# Patient Record
Sex: MALE | Race: WHITE | Employment: PART TIME | URBAN - METROPOLITAN AREA
[De-identification: names, ages, dates, MRNs, and addresses within clinical notes are randomized per-mention and may not be internally consistent; named-entity substitution may affect disease eponyms.]

---

## 2017-01-04 ENCOUNTER — HOSPITAL ENCOUNTER (OUTPATIENT)
Dept: GENERAL RADIOLOGY | Age: 37
Discharge: HOME | End: 2017-01-04
Payer: MEDICAID

## 2017-01-04 DIAGNOSIS — J45.30 MILD PERSISTENT ASTHMA: ICD-10-CM

## 2017-01-04 PROCEDURE — 71020 XR CHEST PA LAT: CPT

## 2019-02-19 ENCOUNTER — OFFICE VISIT (OUTPATIENT)
Dept: PRIMARY CARE CLINIC | Age: 39
End: 2019-02-19

## 2019-02-19 VITALS
SYSTOLIC BLOOD PRESSURE: 95 MMHG | HEART RATE: 55 BPM | TEMPERATURE: 98.1 F | BODY MASS INDEX: 22.45 KG/M2 | DIASTOLIC BLOOD PRESSURE: 60 MMHG | HEIGHT: 69 IN | OXYGEN SATURATION: 97 % | RESPIRATION RATE: 18 BRPM | WEIGHT: 151.6 LBS

## 2019-02-19 DIAGNOSIS — F25.9 SCHIZOAFFECTIVE DISORDER, UNSPECIFIED TYPE (HCC): ICD-10-CM

## 2019-02-19 DIAGNOSIS — M54.50 ACUTE LEFT-SIDED LOW BACK PAIN WITHOUT SCIATICA: Primary | ICD-10-CM

## 2019-02-19 DIAGNOSIS — E03.9 ACQUIRED HYPOTHYROIDISM: ICD-10-CM

## 2019-02-19 DIAGNOSIS — L70.9 ACNE, UNSPECIFIED ACNE TYPE: ICD-10-CM

## 2019-02-19 DIAGNOSIS — F70 MILD INTELLECTUAL DISABILITY: ICD-10-CM

## 2019-02-19 PROBLEM — H55.00 NYSTAGMUS: Status: ACTIVE | Noted: 2019-02-19

## 2019-02-19 RX ORDER — TAMSULOSIN HYDROCHLORIDE 0.4 MG/1
0.4 CAPSULE ORAL DAILY
COMMUNITY
End: 2022-10-12

## 2019-02-19 RX ORDER — IBUPROFEN 600 MG/1
600 TABLET ORAL EVERY 12 HOURS
Qty: 10 TAB | Refills: 0 | Status: SHIPPED | OUTPATIENT
Start: 2019-02-19 | End: 2019-02-20 | Stop reason: SDUPTHER

## 2019-02-19 RX ORDER — CLINDAMYCIN PHOSPHATE 10 MG/G
GEL TOPICAL 2 TIMES DAILY
COMMUNITY
End: 2019-05-06 | Stop reason: SDUPTHER

## 2019-02-19 RX ORDER — DOCUSATE SODIUM 100 MG/1
100 CAPSULE, LIQUID FILLED ORAL 2 TIMES DAILY
COMMUNITY
End: 2019-04-17 | Stop reason: SDUPTHER

## 2019-02-19 RX ORDER — ACETAMINOPHEN 500 MG
TABLET ORAL
COMMUNITY
End: 2019-02-19 | Stop reason: SDUPTHER

## 2019-02-19 RX ORDER — ACETAMINOPHEN 500 MG
500 TABLET ORAL
Qty: 30 TAB | Refills: 2 | Status: SHIPPED | OUTPATIENT
Start: 2019-02-19 | End: 2020-11-02

## 2019-02-19 RX ORDER — CIPROFLOXACIN AND DEXAMETHASONE 3; 1 MG/ML; MG/ML
4 SUSPENSION/ DROPS AURICULAR (OTIC) 2 TIMES DAILY
COMMUNITY
End: 2021-10-18 | Stop reason: ALTCHOICE

## 2019-02-19 NOTE — PROGRESS NOTES
Written by Carolyne Valdez, as dictated by Dr. Faheem Pritchett MD.    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 HPI  The patient comes in today to establish care. His group home staff member is helping to provide his HX. Patient's medical HX is significant for schizoaffective disorder, depressive disorder, hypothyroidism, nystagmus, BL hearing loss w/ BL hearing aids, asthma, mild ID. Denies surgical HX. [place] Patient has been experiencing L side low back pain off and on x about 2 months. His group home staff member has not hear him c/o back pain prior to today. His pain does not radiate. The patient notes that he has to bend over frequently while he works, which is when he experiences the pain. He describes the sensation as \"achey\" and notes that he took OTC acetaminophen 500 mg last night. His medication list shows that he is taking vitamin D3 2,000 iu daily but his staff member does not know why he is taking it. He is a smoker. Patient smokes 3 cigarettes per week day and 4 cigarettes per weekend days. The patient notes that when he was a child he has frequent ear infections and believes he may have had tubes placed. Followed by urology as patient uses a catheter. His group home staff member is not sure why he has a catheter, but believes it is related to \"his bladder being heavy. \" Pt is taking Flomax 0.4 mg. He sees a podiatrist but his group home staff member notes that he has not been in a while.     Followed by dermatology for acne on face and chest.    Patient Active Problem List   Diagnosis Code    Mild intellectual disability F70    Schizoaffective disorder ([facility] F25.9    Acute left-sided low back pain without sciatica M54.5    Acquired hypothyroidism E03.9    Acne L70.9        Current Outpatient Medications on File Prior to Visit   Medication Sig Dispense Refill    ciprofloxacin-dexamethasone (CIPRODEX) 0.3-0.1 % otic suspension Administer 4 Drops in left ear two (2) times a day.  clindamycin (CLINDAGEL) 1 % topical gel Apply  to affected area two (2) times a day. use thin film on affected area      docusate sodium (DULCOLAX STOOL SOFTENER, DSS,) 100 mg capsule Take 100 mg by mouth two (2) times a day.  tamsulosin (FLOMAX) 0.4 mg capsule Take 0.4 mg by mouth daily.  albuterol sulfate 90 mcg/actuation aepb Take  by inhalation as needed.  gabapentin (NEURONTIN) 400 mg capsule Take 400 mg by mouth three (3) times daily.  levothyroxine (SYNTHROID) 100 mcg tablet Take 100 mcg by mouth Daily (before breakfast).  lithium carbonate (LITHOBID) 150 mg capsule Take 150 mg by mouth daily.  lithium carbonate (LITHONATE) 300 mg capsule Take 300 mg by mouth daily. morning      lithium carbonate (LITHONATE) 300 mg capsule Take 300 mg by mouth daily. At night      OLANZapine (ZYPREXA) 15 mg tablet Take 15 mg by mouth nightly.  propranolol (INDERAL) 20 mg tablet Take 20 mg by mouth two (2) times a day.  QUEtiapine (SEROQUEL) 400 mg tablet Take 400 mg by mouth two (2) times a day.  sertraline (ZOLOFT) 50 mg tablet Take 50 mg by mouth daily.  ibuprofen (MOTRIN) 600 mg tablet Take 1 Tab by mouth every eight (8) hours as needed for Pain. 30 Tab 0    cholecalciferol, vitamin D3, 2,000 unit tab Take 200 Units by mouth. No current facility-administered medications on file prior to visit.         Past Medical History:   Diagnosis Date    Depressive disorder     Hypothyroid     Schizoaffective disorder (HCC)     Uses hearing aid        Social History     Socioeconomic History    Marital status: SINGLE     Spouse name: Not on file    Number of children: Not on file    Years of education: Not on file    Highest education level: Not on file   Social Needs    Financial resource strain: Not on file    Food insecurity - worry: Not on file    Food insecurity - inability: Not on file    Transportation needs - medical: Not on file   Kior needs - non-medical: Not on file   Occupational History    Not on file   Tobacco Use    Smoking status: Current Every Day Smoker     Packs/day: 0.25    Smokeless tobacco: Current User   Substance and Sexual Activity    Alcohol use: No    Drug use: No    Sexual activity: Not on file   Other Topics Concern    Not on file   Social History Narrative    Not on file       Review of Systems   HENT: Positive for hearing loss. Negative for congestion. Genitourinary: Negative for frequency and urgency. Musculoskeletal: Positive for back pain (L side low). Negative for joint pain and myalgias. Skin:        +Acne on face and chest   Neurological: Negative for dizziness, tingling, sensory change and headaches. Psychiatric/Behavioral: Negative for depression, memory loss and substance abuse. Visit Vitals  BP 95/60 (BP 1 Location: Right arm, BP Patient Position: Sitting)   Pulse (!) 55   Temp 98.1 °F (36.7 °C) (Oral)   Resp 18   Ht 5' 9\" (1.753 m)   Wt 151 lb 9.6 oz (68.8 kg)   SpO2 97%   BMI 22.39 kg/m²       Physical Exam   Constitutional: He is oriented to person, place, and time. He appears well-developed and well-nourished. No distress. HENT:   Right Ear: Tympanic membrane, external ear and ear canal normal.   Left Ear: Tympanic membrane, external ear and ear canal normal.   Mouth/Throat: Oropharynx is clear and moist. He does not have dentures. Abnormal dentition. No oropharyngeal exudate. BL hearing aid  Old scar marks in L ear  No teeth   Eyes: Conjunctivae are normal. Right eye exhibits no discharge. Left eye exhibits no discharge. Right eye exhibits nystagmus. Left eye exhibits nystagmus. Neck: Normal range of motion. Neck supple. Cardiovascular: Normal rate and regular rhythm. Pulmonary/Chest: Effort normal and breath sounds normal. He has no wheezes. Abdominal: Soft. Bowel sounds are normal. There is no tenderness. Musculoskeletal:   Non mayra.  Mild low back pain on flexion and extension   Lymphadenopathy:     He has no cervical adenopathy. Neurological: He is alert and oriented to person, place, and time. Skin: He is not diaphoretic. Psychiatric: He has a normal mood and affect. His behavior is normal.   Nursing note and vitals reviewed. ASSESSMENT and PLAN    ICD-10-CM ICD-9-CM    1. Acute left-sided low back pain without sciatica M54.5 724.2 Ibuprofen 600 mg prescribed. He should take 1 tab PO every 12 hours with food. 2. [facility]oaffective disorder, unspecified type [facility] F25.9 295.70 Patient is under the care of a group home and staff makes sure he is compliant on medication. 3. Mild intellectual disability F70 317 Patient is under the care of a group home. 4. Acquired hypothyroidism E03.9 244.9 Compliant on levothyroxine 100 mcg. Will do lab in next visit. 5. Acne, unspecified acne type L70.9 706.1 Followed by dermatology. Medication risks/benefits/costs/interactions/alternatives discussed with patient. Advised patient to call back or return to office if symptoms worsen/change/persist. If patient cannot reach us or should anything more severe/urgent arise he/she should proceed directly to the nearest emergency department. Discussed expected course/resolution/complications of diagnosis in detail with patient. Patient given a written after visit summary which includes her diagnoses, current medications and vitals. Patient expressed understanding with the diagnosis and plan.   Follow up if symptoms worsen. This plan was reviewed with the patient and patient agrees. All questions were answered. This scribe documentation was reviewed by me and accurately reflects the examination and decisions made by me.

## 2019-04-09 ENCOUNTER — TELEPHONE (OUTPATIENT)
Dept: PRIMARY CARE CLINIC | Age: 39
End: 2019-04-09

## 2019-04-09 NOTE — TELEPHONE ENCOUNTER
Estelle is the supervisor at the group home. States that pt had blood in stool today but that was after not having a BM in 2 days and then today he finally had a large bowel movement. States that she thinks the blood was from him straining, but he is fine. No more blood noticed. Will follow up as needed. They have to let us know when there are any significant changes.

## 2019-04-09 NOTE — TELEPHONE ENCOUNTER
Estelle would like to report the status of a bowel movement.   She may be reached back @ [phone]

## 2019-05-06 ENCOUNTER — OFFICE VISIT (OUTPATIENT)
Dept: PRIMARY CARE CLINIC | Age: 39
End: 2019-05-06

## 2019-05-06 VITALS
DIASTOLIC BLOOD PRESSURE: 65 MMHG | BODY MASS INDEX: 22.48 KG/M2 | OXYGEN SATURATION: 97 % | HEIGHT: 69 IN | HEART RATE: 63 BPM | WEIGHT: 151.8 LBS | RESPIRATION RATE: 16 BRPM | TEMPERATURE: 97.5 F | SYSTOLIC BLOOD PRESSURE: 98 MMHG

## 2019-05-06 DIAGNOSIS — Z00.00 WELL ADULT ON ROUTINE HEALTH CHECK: Primary | ICD-10-CM

## 2019-05-06 DIAGNOSIS — L70.9 ACNE, UNSPECIFIED ACNE TYPE: ICD-10-CM

## 2019-05-06 DIAGNOSIS — E03.9 ACQUIRED HYPOTHYROIDISM: ICD-10-CM

## 2019-05-06 DIAGNOSIS — E55.9 VITAMIN D DEFICIENCY: ICD-10-CM

## 2019-05-06 DIAGNOSIS — Z11.1 SCREENING-PULMONARY TB: ICD-10-CM

## 2019-05-06 RX ORDER — CLINDAMYCIN PHOSPHATE 10 MG/G
GEL TOPICAL 2 TIMES DAILY
Qty: 1 ML | Refills: 3 | Status: SHIPPED | OUTPATIENT
Start: 2019-05-06 | End: 2020-04-22 | Stop reason: SDUPTHER

## 2019-05-06 NOTE — PROGRESS NOTES
Claire Connor is a 41 y.o. male    Exam RM: 9    Chief Complaint   Patient presents with    Physical     Pt is here for a PE.       1. Have you been to the ER, urgent care clinic since your last visit? Hospitalized since your last visit? No     2. Have you seen or consulted any other health care providers outside of the [address] [place] [address] since your last visit? Include any pap smears or colon screening.   No       Health Maintenance Due   Topic Date Due    Pneumococcal 0-64 years (1 of 1 - PPSV23) [birth year]1988         Visit Vitals  BP 98/65 (BP 1 Location: Left arm, BP Patient Position: Sitting)   Pulse 63   Temp 97.5 °F (36.4 °C) (Oral)   Resp 16   Ht 5' 9\" (1.753 m)   Wt 151 lb 12.8 oz (68.9 kg)   SpO2 97%   BMI 22.42 kg/m²

## 2019-05-06 NOTE — PROGRESS NOTES
Subjective:   Emily Bobyb is a 41 y.o. male presenting for his annual checkup. He is here with his group home staff member. His  medical HX is significant for schizoaffective disorder, depressive disorder, hypothyroidism, nystagmus, BL hearing loss w/ BL hearing aids, asthma, mild ID. He has been following up with psychiatrist regularly. Hypothyroid; he is currently on synthroid 100 mcg daily. Acne: well controlled with Clindagel. Need refill. He has been following up with urologist regularly and taking Flomax 0.4 mg.     Currently On Vit D 2,000 iu daily. Need  TB screen. Need sunscreen refill. Brought special olympic form for me to filled out. ROS:  Feeling well. No dyspnea or chest pain on exertion. No abdominal pain, change in bowel habits, black or bloody stools. No urinary tract or prostatic symptoms. No neurological complaints. Specific concerns today: refer to HPI. Patient Active Problem List   Diagnosis Code    Mild intellectual disability F70    Schizoaffective disorder ([facility] F25.9    Acute left-sided low back pain without sciatica M54.5    Acquired hypothyroidism E03.9    Acne L70.9    Nystagmus H55.00     Current Outpatient Medications   Medication Sig Dispense Refill    clindamycin (CLINDAGEL) 1 % topical gel Apply  to affected area two (2) times a day. use thin film on affected area 1 mL 3    sunscreen 15 SPF lotion Use it as needed. 1 Bottle 1    DOC-Q-LACE 100 mg capsule TAKE (1) CAPSULE BY MOUTH DAILY 30 Cap PRN    levothyroxine (SYNTHROID) 100 mcg tablet TAKE 1 TABLET BY MOUTH DAILY 30 Tab PRN    ibuprofen (MOTRIN) 600 mg tablet Take 1 Tab by mouth every twelve (12) hours every twelve (12) hours. With food for back pain 10 Tab 0    tamsulosin (FLOMAX) 0.4 mg capsule Take 0.4 mg by mouth daily.  albuterol sulfate 90 mcg/actuation aepb Take  by inhalation as needed.       acetaminophen (TYLENOL EXTRA STRENGTH) 500 mg tablet Take 1 Tab by mouth every six (6) hours as needed for Pain or Fever. 30 Tab 2    ibuprofen (MOTRIN) 600 mg tablet Take 1 Tab by mouth every eight (8) hours as needed for Pain. 30 Tab 0    cholecalciferol, vitamin D3, 2,000 unit tab Take 200 Units by mouth.  gabapentin (NEURONTIN) 400 mg capsule Take 400 mg by mouth three (3) times daily.  lithium carbonate (LITHOBID) 150 mg capsule Take 150 mg by mouth daily.  lithium carbonate (LITHONATE) 300 mg capsule Take 300 mg by mouth daily. morning      lithium carbonate (LITHONATE) 300 mg capsule Take 300 mg by mouth daily. At night      OLANZapine (ZYPREXA) 15 mg tablet Take 15 mg by mouth nightly.  propranolol (INDERAL) 20 mg tablet Take 20 mg by mouth two (2) times a day.  QUEtiapine (SEROQUEL) 400 mg tablet Take 400 mg by mouth two (2) times a day.  ciprofloxacin-dexamethasone (CIPRODEX) 0.3-0.1 % otic suspension Administer 4 Drops in left ear two (2) times a day.  sertraline (ZOLOFT) 50 mg tablet Take 50 mg by mouth daily. No Known Allergies  Past Medical History:   Diagnosis Date    Depressive disorder     Hypothyroid     Schizoaffective disorder ([facility] [address] [facility]     Uses hearing aid      History reviewed. No pertinent surgical history. History reviewed. No pertinent family history. Social History     Tobacco Use    Smoking status: Current Every Day Smoker     Packs/day: 0.25    Smokeless tobacco: Current User   Substance Use Topics    Alcohol use: No             Objective:     Visit Vitals  BP 98/65 (BP 1 Location: Left arm, BP Patient Position: Sitting)   Pulse 63   Temp 97.5 °F (36.4 °C) (Oral)   Resp 16   Ht 5' 9\" (1.753 m)   Wt 151 lb 12.8 oz (68.9 kg)   SpO2 97%   BMI 22.42 kg/m²     The patient appears well, alert, oriented x 2, in no distress. Nystagmus noted. ENT normal. Wearing hearing aid. Neck supple. No adenopathy or thyromegaly. AVILA. Lungs are clear, good air entry, no wheezes, rhonchi or rales.  S1 and S2 normal, no murmurs, regular rate and rhythm. Abdomen is soft without tenderness, guarding, mass or organomegaly.  exam: deferred. Extremities show no edema, normal peripheral pulses. Neurological is normal without focal findings. Acne: old scar marks but otherwise no active acne noted. Assessment/Plan:   healthy adult male  follow low fat diet, follow low salt diet, continue present plan, routine labs ordered, call if any problems. ICD-10-CM ICD-9-CM    1. Well adult on routine health check Z00.00 V70.0 CBC WITH AUTOMATED DIFF      LIPID PANEL      METABOLIC PANEL, COMPREHENSIVE   2. Acquired hypothyroidism E03.9 244.9 TSH AND FREE T4   3. Acne, unspecified acne type L70.9 706.1 clindamycin (CLINDAGEL) 1 % topical gel   4. Screening-pulmonary TB Z11.1 V74.1 QUANTIFERON-TB GOLD PLUS   5. Vitamin D deficiency E55.9 268.9 VITAMIN D, 25 HYDROXY     Diagnoses and all orders for this visit:    1. Well adult on routine health check  -     CBC WITH AUTOMATED DIFF  -     LIPID PANEL  -     METABOLIC PANEL, COMPREHENSIVE    2. Acquired hypothyroidism  -     TSH AND FREE T4              Will notify if any change needed. 3. Acne, unspecified acne type  -   Stable with   clindamycin (CLINDAGEL) 1 % topical gel; Apply  to affected area two (2) times a day. use thin film on affected area    4. Screening-pulmonary TB  -     QUANTIFERON-TB GOLD PLUS    5. Vitamin D deficiency  -     VITAMIN D, 25 HYDROXY    Other orders  -     sunscreen 15 SPF lotion; Use it as needed. Follow-up and Dispositions    · Return in about 6 months (around 11/6/2019) for  6 months check ip. .       reviewed diet, exercise and weight control  reviewed medications and side effects in detail.

## 2019-05-07 LAB
25(OH)D3+25(OH)D2 SERPL-MCNC: 28.9 NG/ML (ref 30–100)
ALBUMIN SERPL-MCNC: 4.7 G/DL (ref 3.5–5.5)
ALBUMIN/GLOB SERPL: 1.7 {RATIO} (ref 1.2–2.2)
ALP SERPL-CCNC: 76 IU/L (ref 39–117)
ALT SERPL-CCNC: 15 IU/L (ref 0–44)
AST SERPL-CCNC: 19 IU/L (ref 0–40)
BASOPHILS # BLD AUTO: 0.1 X10E3/UL (ref 0–0.2)
BASOPHILS NFR BLD AUTO: 1 %
BILIRUB SERPL-MCNC: 0.4 MG/DL (ref 0–1.2)
BUN SERPL-MCNC: 13 MG/DL (ref 6–20)
BUN/CREAT SERPL: 15 (ref 9–20)
CALCIUM SERPL-MCNC: 10.3 MG/DL (ref 8.7–10.2)
CHLORIDE SERPL-SCNC: 105 MMOL/L (ref 96–106)
CHOLEST SERPL-MCNC: 155 MG/DL (ref 100–199)
CO2 SERPL-SCNC: 20 MMOL/L (ref 20–29)
CREAT SERPL-MCNC: 0.86 MG/DL (ref 0.76–1.27)
EOSINOPHIL # BLD AUTO: 0.2 X10E3/UL (ref 0–0.4)
EOSINOPHIL NFR BLD AUTO: 2 %
ERYTHROCYTE [DISTWIDTH] IN BLOOD BY AUTOMATED COUNT: 14.5 % (ref 12.3–15.4)
GLOBULIN SER CALC-MCNC: 2.8 G/DL (ref 1.5–4.5)
GLUCOSE SERPL-MCNC: 95 MG/DL (ref 65–99)
HCT VFR BLD AUTO: 48.3 % (ref 37.5–51)
HDLC SERPL-MCNC: 60 MG/DL
HGB BLD-MCNC: 16.6 G/DL (ref 13–17.7)
IMM GRANULOCYTES # BLD AUTO: 0 X10E3/UL (ref 0–0.1)
IMM GRANULOCYTES NFR BLD AUTO: 0 %
LDLC SERPL CALC-MCNC: 79 MG/DL (ref 0–99)
LYMPHOCYTES # BLD AUTO: 1.9 X10E3/UL (ref 0.7–3.1)
LYMPHOCYTES NFR BLD AUTO: 19 %
MCH RBC QN AUTO: 29.7 PG (ref 26.6–33)
MCHC RBC AUTO-ENTMCNC: 34.4 G/DL (ref 31.5–35.7)
MCV RBC AUTO: 87 FL (ref 79–97)
MONOCYTES # BLD AUTO: 0.6 X10E3/UL (ref 0.1–0.9)
MONOCYTES NFR BLD AUTO: 6 %
NEUTROPHILS # BLD AUTO: 7.4 X10E3/UL (ref 1.4–7)
NEUTROPHILS NFR BLD AUTO: 72 %
PLATELET # BLD AUTO: 234 X10E3/UL (ref 150–379)
POTASSIUM SERPL-SCNC: 4.6 MMOL/L (ref 3.5–5.2)
PROT SERPL-MCNC: 7.5 G/DL (ref 6–8.5)
RBC # BLD AUTO: 5.58 X10E6/UL (ref 4.14–5.8)
SODIUM SERPL-SCNC: 141 MMOL/L (ref 134–144)
T4 FREE SERPL-MCNC: 1.3 NG/DL (ref 0.82–1.77)
TRIGL SERPL-MCNC: 81 MG/DL (ref 0–149)
TSH SERPL DL<=0.005 MIU/L-ACNC: 0.5 UIU/ML (ref 0.45–4.5)
VLDLC SERPL CALC-MCNC: 16 MG/DL (ref 5–40)
WBC # BLD AUTO: 10.3 X10E3/UL (ref 3.4–10.8)

## 2019-05-07 NOTE — PROGRESS NOTES
Please mail letter:     1. CBC, kidney, liver, thyroid level is within normal range. 2. Cholesterol level is normal    3. Vit d level is slightly low from the normal range. Continue  2,000 IU vit D daily. Increase vit D rich food. Exposed to sun total of 60 minutes /week will help. Will recheck level in 6 months.

## 2019-05-09 LAB
GAMMA INTERFERON BACKGROUND BLD IA-ACNC: 0.01 IU/ML
M TB IFN-G BLD-IMP: NEGATIVE
M TB IFN-G CD4+ BCKGRND COR BLD-ACNC: 0.01 IU/ML
MITOGEN IGNF BLD-ACNC: >10 IU/ML
QUANTIFERON INCUBATION, QF1T: NORMAL
QUANTIFERON TB2 AG: 0.01 IU/ML
SERVICE CMNT-IMP: NORMAL

## 2019-10-02 NOTE — TELEPHONE ENCOUNTER
Spoke with Wilber Westbrook, and let her know to call his urologist. She verbalized her understanding and stated she is going to call them and ask for a refill. She stated she will call us back if they won't refill it.

## 2019-10-03 RX ORDER — TAMSULOSIN HYDROCHLORIDE 0.4 MG/1
0.4 CAPSULE ORAL DAILY
OUTPATIENT
Start: 2019-10-03

## 2019-10-09 ENCOUNTER — TELEPHONE (OUTPATIENT)
Dept: PRIMARY CARE CLINIC | Age: 39
End: 2019-10-09

## 2019-10-09 NOTE — TELEPHONE ENCOUNTER
Do you suggest that the patient needs an eye exam?     I will also let them know once I contact them that they need to get the documentation requested from his urologist.

## 2019-10-09 NOTE — TELEPHONE ENCOUNTER
Spoke with Silvano and let her know to call his vision insurance and see who they cover as well as call his urologist to see \"why\" he needs to cath himself. She verbalized her understanding and thanked me for the call.

## 2019-10-09 NOTE — TELEPHONE ENCOUNTER
----- Message from Kevan Alvarez sent at 10/8/2019 10:32 AM EDT -----  Regarding: /Carlo Schaefer a Home Care Manager advised she needs information regarding th pt vision and does he need an eye exam. She also advised the need documentation on why the pt is \"self caphatilized\". Best contact [phone].

## 2019-10-09 NOTE — TELEPHONE ENCOUNTER
Yearly eye exam is recommended. They need to call his eye insurance for the provider name to make appointment.

## 2019-10-16 ENCOUNTER — HOSPITAL ENCOUNTER (EMERGENCY)
Age: 39
Discharge: HOME | End: 2019-10-16
Attending: EMERGENCY MEDICINE
Payer: MEDICAID

## 2019-10-16 VITALS
RESPIRATION RATE: 22 BRPM | OXYGEN SATURATION: 100 % | HEART RATE: 80 BPM | DIASTOLIC BLOOD PRESSURE: 61 MMHG | SYSTOLIC BLOOD PRESSURE: 104 MMHG | TEMPERATURE: 97.4 F

## 2019-10-16 DIAGNOSIS — R55 NEAR SYNCOPE: ICD-10-CM

## 2019-10-16 DIAGNOSIS — E86.0 DEHYDRATION: Primary | ICD-10-CM

## 2019-10-16 LAB
ALBUMIN SERPL-MCNC: 3.9 G/DL (ref 3.5–5)
ALBUMIN/GLOB SERPL: 1.2 {RATIO} (ref 1.1–2.2)
ALP SERPL-CCNC: 75 U/L (ref 45–117)
ALT SERPL-CCNC: 23 U/L (ref 12–78)
ANION GAP SERPL CALC-SCNC: 4 MMOL/L (ref 5–15)
AST SERPL-CCNC: 18 U/L (ref 15–37)
ATRIAL RATE: 69 BPM
BASOPHILS # BLD: 0.1 K/UL (ref 0–0.1)
BASOPHILS NFR BLD: 1 % (ref 0–1)
BILIRUB SERPL-MCNC: 0.3 MG/DL (ref 0.2–1)
BUN SERPL-MCNC: 9 MG/DL (ref 6–20)
BUN/CREAT SERPL: 12 (ref 12–20)
CALCIUM SERPL-MCNC: 8.8 MG/DL (ref 8.5–10.1)
CALCULATED P AXIS, ECG09: 55 DEGREES
CALCULATED R AXIS, ECG10: -7 DEGREES
CALCULATED T AXIS, ECG11: 25 DEGREES
CHLORIDE SERPL-SCNC: 111 MMOL/L (ref 97–108)
CO2 SERPL-SCNC: 25 MMOL/L (ref 21–32)
CREAT SERPL-MCNC: 0.74 MG/DL (ref 0.7–1.3)
DATE LAST DOSE: NORMAL
DIAGNOSIS, 93000: NORMAL
DIFFERENTIAL METHOD BLD: ABNORMAL
EOSINOPHIL # BLD: 0.2 K/UL (ref 0–0.4)
EOSINOPHIL NFR BLD: 2 % (ref 0–7)
ERYTHROCYTE [DISTWIDTH] IN BLOOD BY AUTOMATED COUNT: 13.7 % (ref 11.5–14.5)
GLOBULIN SER CALC-MCNC: 3.3 G/DL (ref 2–4)
GLUCOSE SERPL-MCNC: 69 MG/DL (ref 65–100)
HCT VFR BLD AUTO: 47.7 % (ref 36.6–50.3)
HGB BLD-MCNC: 15.6 G/DL (ref 12.1–17)
IMM GRANULOCYTES # BLD AUTO: 0 K/UL (ref 0–0.04)
IMM GRANULOCYTES NFR BLD AUTO: 0 % (ref 0–0.5)
LITHIUM SERPL-SCNC: 0.67 MMOL/L (ref 0.6–1.2)
LYMPHOCYTES # BLD: 1.2 K/UL (ref 0.8–3.5)
LYMPHOCYTES NFR BLD: 11 % (ref 12–49)
MCH RBC QN AUTO: 29.7 PG (ref 26–34)
MCHC RBC AUTO-ENTMCNC: 32.7 G/DL (ref 30–36.5)
MCV RBC AUTO: 90.9 FL (ref 80–99)
MONOCYTES # BLD: 0.9 K/UL (ref 0–1)
MONOCYTES NFR BLD: 8 % (ref 5–13)
NEUTS SEG # BLD: 9 K/UL (ref 1.8–8)
NEUTS SEG NFR BLD: 78 % (ref 32–75)
NRBC # BLD: 0 K/UL (ref 0–0.01)
NRBC BLD-RTO: 0 PER 100 WBC
P-R INTERVAL, ECG05: 158 MS
PLATELET # BLD AUTO: 211 K/UL (ref 150–400)
PMV BLD AUTO: 10.9 FL (ref 8.9–12.9)
POTASSIUM SERPL-SCNC: 3.6 MMOL/L (ref 3.5–5.1)
PROT SERPL-MCNC: 7.2 G/DL (ref 6.4–8.2)
Q-T INTERVAL, ECG07: 404 MS
QRS DURATION, ECG06: 104 MS
QTC CALCULATION (BEZET), ECG08: 432 MS
RBC # BLD AUTO: 5.25 M/UL (ref 4.1–5.7)
REPORTED DOSE,DOSE: NORMAL UNITS
REPORTED DOSE/TIME,TMG: NORMAL
SODIUM SERPL-SCNC: 140 MMOL/L (ref 136–145)
VENTRICULAR RATE, ECG03: 69 BPM
WBC # BLD AUTO: 11.5 K/UL (ref 4.1–11.1)

## 2019-10-16 PROCEDURE — 93005 ELECTROCARDIOGRAM TRACING: CPT

## 2019-10-16 PROCEDURE — 80053 COMPREHEN METABOLIC PANEL: CPT

## 2019-10-16 PROCEDURE — 96361 HYDRATE IV INFUSION ADD-ON: CPT

## 2019-10-16 PROCEDURE — 99285 EMERGENCY DEPT VISIT HI MDM: CPT

## 2019-10-16 PROCEDURE — 85025 COMPLETE CBC W/AUTO DIFF WBC: CPT

## 2019-10-16 PROCEDURE — 80178 ASSAY OF LITHIUM: CPT

## 2019-10-16 PROCEDURE — 96360 HYDRATION IV INFUSION INIT: CPT

## 2019-10-16 PROCEDURE — 36415 COLL VENOUS BLD VENIPUNCTURE: CPT

## 2019-10-16 PROCEDURE — 74011250636 HC RX REV CODE- 250/636: Performed by: EMERGENCY MEDICINE

## 2019-10-16 RX ADMIN — SODIUM CHLORIDE 1000 ML: 900 INJECTION, SOLUTION INTRAVENOUS at 15:45

## 2019-10-16 NOTE — ED TRIAGE NOTES
Pt was at work and said room started to spin and he got dizzy and his legs felt like they were giving out.   Denies CP or SOB

## 2019-10-16 NOTE — ED PROVIDER NOTES
EMERGENCY DEPARTMENT HISTORY AND PHYSICAL EXAM      Date: 10/16/2019  Patient Name: Estevan Jackson    History of Presenting Illness     Chief Complaint   Patient presents with    Dizziness       History Provided By: Patient and EMS    HPI: Estevan Jackson, 42 y.o. male presents to the ED with cc of near syncope. According to EMS, the patient was walking in the hallway, when he became dizzy and his legs felt weak. Staff assisted him to the floor and applied a cold compress to his head. The patient states that he ate this morning and has been drinking fluids. He denies headache, chest pain, shortness of breath or abdominal pain. He also denies fever, chills or cough. There are no other complaints, changes, or physical findings at this time. Smoking cessation note: The patient was encouraged to quit smoking. The duration of this conversation was 1 minute. PCP: Zaira Larkin MD    No current facility-administered medications on file prior to encounter. Current Outpatient Medications on File Prior to Encounter   Medication Sig Dispense Refill    clindamycin (CLINDAGEL) 1 % topical gel Apply  to affected area two (2) times a day. use thin film on affected area 1 mL 3    sunscreen 15 SPF lotion Use it as needed. 1 Bottle 1    DOC-Q-LACE 100 mg capsule TAKE (1) CAPSULE BY MOUTH DAILY 30 Cap PRN    levothyroxine (SYNTHROID) 100 mcg tablet TAKE 1 TABLET BY MOUTH DAILY 30 Tab PRN    ibuprofen (MOTRIN) 600 mg tablet Take 1 Tab by mouth every twelve (12) hours every twelve (12) hours. With food for back pain 10 Tab 0    ciprofloxacin-dexamethasone (CIPRODEX) 0.3-0.1 % otic suspension Administer 4 Drops in left ear two (2) times a day.  tamsulosin (FLOMAX) 0.4 mg capsule Take 0.4 mg by mouth daily.  albuterol sulfate 90 mcg/actuation aepb Take  by inhalation as needed.       acetaminophen (TYLENOL EXTRA STRENGTH) 500 mg tablet Take 1 Tab by mouth every six (6) hours as needed for Pain or Fever. 30 Tab 2    ibuprofen (MOTRIN) 600 mg tablet Take 1 Tab by mouth every eight (8) hours as needed for Pain. 30 Tab 0    cholecalciferol, vitamin D3, 2,000 unit tab Take 200 Units by mouth.  gabapentin (NEURONTIN) 400 mg capsule Take 400 mg by mouth three (3) times daily.  lithium carbonate (LITHOBID) 150 mg capsule Take 150 mg by mouth daily.  lithium carbonate (LITHONATE) 300 mg capsule Take 300 mg by mouth daily. morning      lithium carbonate (LITHONATE) 300 mg capsule Take 300 mg by mouth daily. At night      OLANZapine (ZYPREXA) 15 mg tablet Take 15 mg by mouth nightly.  propranolol (INDERAL) 20 mg tablet Take 20 mg by mouth two (2) times a day.  QUEtiapine (SEROQUEL) 400 mg tablet Take 400 mg by mouth two (2) times a day.  sertraline (ZOLOFT) 50 mg tablet Take 50 mg by mouth daily. Past History     Past Medical History:  Past Medical History:   Diagnosis Date    Depressive disorder     Hypothyroid     Schizoaffective disorder ([address]     Uses hearing aid        Past Surgical History:  No past surgical history on file. Family History:  No family history on file. Social History:  Social History     Tobacco Use    Smoking status: Current Every Day Smoker     Packs/day: 0.25    Smokeless tobacco: Current User   Substance Use Topics    Alcohol use: No    Drug use: No       Allergies:  No Known Allergies      Review of Systems   Review of Systems   Constitutional: Negative for chills and fever. HENT: Negative for congestion. Eyes: Negative. Respiratory: Negative for shortness of breath. Cardiovascular: Negative for chest pain. Gastrointestinal: Negative for abdominal pain. Endocrine: Negative for heat intolerance. Genitourinary: Negative. Musculoskeletal: Negative for back pain. Skin: Negative for rash. Allergic/Immunologic: Negative for immunocompromised state. Neurological: Positive for dizziness and light-headedness.  Negative for headaches. Hematological: Does not bruise/bleed easily. Psychiatric/Behavioral: Negative. All other systems reviewed and are negative. Physical Exam   Physical Exam   Constitutional: He appears well-developed and well-nourished. No distress. HENT:   Head: Normocephalic and atraumatic. Eyes: Pupils are equal, round, and reactive to light. EOM are normal.   Baseline nystagmus   Neck: Normal range of motion. Neck supple. Cardiovascular: Normal rate, regular rhythm, normal heart sounds and intact distal pulses. Pulmonary/Chest: Effort normal and breath sounds normal. No respiratory distress. Abdominal: Soft. Bowel sounds are normal. There is no tenderness. Musculoskeletal: Normal range of motion. He exhibits no edema. Neurological: He is alert. Coordination normal.   Motor intact   Skin: Skin is warm and dry. Psychiatric: He has a normal mood and affect. Nursing note and vitals reviewed. Diagnostic Study Results     Labs -     Recent Results (from the past 12 hour(s))   EKG, 12 LEAD, INITIAL    Collection Time: 10/16/19  2:08 PM   Result Value Ref Range    Ventricular Rate 69 BPM    Atrial Rate 69 BPM    P-R Interval 158 ms    QRS Duration 104 ms    Q-T Interval 404 ms    QTC Calculation (Bezet) 432 ms    Calculated P Axis 55 degrees    Calculated R Axis -7 degrees    Calculated T Axis 25 degrees    Diagnosis       Normal sinus rhythm  Incomplete right bundle branch block  When compared with ECG of 27-DEC-2016 16:32,  No significant change was found     CBC WITH AUTOMATED DIFF    Collection Time: 10/16/19  2:24 PM   Result Value Ref Range    WBC 11.5 (H) 4.1 - 11.1 K/uL    RBC 5.25 4. 10 - 5.70 M/uL    HGB 15.6 12.1 - 17.0 g/dL    HCT 47.7 36.6 - 50.3 %    MCV 90.9 80.0 - 99.0 FL    MCH 29.7 26.0 - 34.0 PG    MCHC 32.7 30.0 - 36.5 g/dL    RDW 13.7 11.5 - 14.5 %    PLATELET 325 734 - 812 K/uL    MPV 10.9 8.9 - 12.9 FL    NRBC 0.0 0  WBC    ABSOLUTE NRBC 0.00 0.00 - 0.01 K/uL NEUTROPHILS 78 (H) 32 - 75 %    LYMPHOCYTES 11 (L) 12 - 49 %    MONOCYTES 8 5 - 13 %    EOSINOPHILS 2 0 - 7 %    BASOPHILS 1 0 - 1 %    IMMATURE GRANULOCYTES 0 0.0 - 0.5 %    ABS. NEUTROPHILS 9.0 (H) 1.8 - 8.0 K/UL    ABS. LYMPHOCYTES 1.2 0.8 - 3.5 K/UL    ABS. MONOCYTES 0.9 0.0 - 1.0 K/UL    ABS. EOSINOPHILS 0.2 0.0 - 0.4 K/UL    ABS. BASOPHILS 0.1 0.0 - 0.1 K/UL    ABS. IMM. GRANS. 0.0 0.00 - 0.04 K/UL    DF AUTOMATED         Radiologic Studies -   No orders to display     CT Results  (Last 48 hours)    None        CXR Results  (Last 48 hours)    None          Medical Decision Making   I am the first provider for this patient. I reviewed the vital signs, available nursing notes, past medical history, past surgical history, family history and social history. Vital Signs-Reviewed the patient's vital signs. Patient Vitals for the past 12 hrs:   Temp Pulse Resp BP SpO2   10/16/19 1421  77  93/46    10/16/19 1420  68 20 95/52 99 %   10/16/19 1419  67  90/56    10/16/19 1417  71  95/52    10/16/19 1410 97.4 °F (36.3 °C) 61 16 90/61 100 %       EKG interpretation: (Preliminary)  Rhythm: normal sinus rhythm; and regular . Rate (approx.): 69; Axis: normal; WV interval: normal; QRS interval: normal ; ST/T wave: normal; Other findings: Incomplete right bundle branch block, no prior EKGs. Records Reviewed: Nursing Notes, Old Medical Records, Ambulance Run Sheet and Previous Laboratory Studies    Provider Notes (Medical Decision Making):   Dehydration, electrolyte abnormality, anemia, arrhythmia,    ED Course:   Initial assessment performed. The patients presenting problems have been discussed, and they are in agreement with the care plan formulated and outlined with them. I have encouraged them to ask questions as they arise throughout their visit. Progress note: The patient is feeling better. His results were reviewed.   He is advised to follow-up and return to ER if worse         Critical Care Time:   0    Disposition:  home    PLAN:  1. Discharge Medication List as of 10/16/2019  6:34 PM        2. Follow-up Information     Follow up With Specialties Details Why Contact Info    Yvrose Botello MD Family Practice In 2 days As needed [address] [facility]  [address]  [phone]      [facility] EMERGENCY DEPT Emergency Medicine  If symptoms worsen [address]  [address] [facility]  [phone]        Return to ED if worse     Diagnosis     Clinical Impression:   1. Dehydration    2. Near syncope        Attestations:    Essie Talley MD    Please note that this dictation was completed with [employer], the [employer] voice recognition software. Quite often unanticipated grammatical, syntax, homophones, and other interpretive errors are inadvertently transcribed by the computer software. Please disregard these errors. Please excuse any errors that have escaped final proofreading. Thank you.

## 2019-10-16 NOTE — DISCHARGE INSTRUCTIONS
Patient Education        Dehydration: Care Instructions  Your Care Instructions  Dehydration happens when your body loses too much fluid. This might happen when you do not drink enough water or you lose large amounts of fluids from your body because of diarrhea, vomiting, or sweating. Severe dehydration can be life-threatening. Water and minerals called electrolytes help put your body fluids back in balance. Learn the early signs of fluid loss, and drink more fluids to prevent dehydration. Follow-up care is a key part of your treatment and safety. Be sure to make and go to all appointments, and call your doctor if you are having problems. It's also a good idea to know your test results and keep a list of the medicines you take. How can you care for yourself at home? · To prevent dehydration, drink plenty of fluids, enough so that your urine is light yellow or clear like water. Choose water and other caffeine-free clear liquids until you feel better. If you have kidney, heart, or liver disease and have to limit fluids, talk with your doctor before you increase the amount of fluids you drink. · If you do not feel like eating or drinking, try taking small sips of water, sports drinks, or other rehydration drinks. · Get plenty of rest.  To prevent dehydration  · Add more fluids to your diet and daily routine, unless your doctor has told you not to. · During hot weather, drink more fluids. Drink even more fluids if you exercise a lot. Stay away from drinks with alcohol or caffeine. · Watch for the symptoms of dehydration. These include:  ? A dry, sticky mouth. ? Dark yellow urine, and not much of it. ? Dry and sunken eyes. ? Feeling very tired. · Learn what problems can lead to dehydration. These include:  ? Diarrhea, fever, and vomiting. ? Any illness with a fever, such as pneumonia or the flu. ?  Activities that cause heavy sweating, such as endurance races and heavy outdoor work in hot or humid weather. ? Alcohol or drug use or problems caused by quitting their use (withdrawal). ? Certain medicines, such as cold and allergy pills (antihistamines), diet pills (diuretics), and laxatives. ? Certain diseases, such as diabetes, cancer, and heart or kidney disease. When should you call for help? Call 911 anytime you think you may need emergency care. For example, call if:    · You passed out (lost consciousness).    Call your doctor now or seek immediate medical care if:    · You are confused and cannot think clearly.     · You are dizzy or lightheaded, or you feel like you may faint.     · You have signs of needing more fluids. You have sunken eyes and a dry mouth, and you pass only a little dark urine.     · You cannot keep fluids down.    Watch closely for changes in your health, and be sure to contact your doctor if:    · You are not making tears.     · Your skin is very dry and sags slowly back into place after you pinch it.     · Your mouth and eyes are very dry. Where can you learn more? Go to http://tran-fritz.info/. Enter H190 in the search box to learn more about \"Dehydration: Care Instructions. \"  Current as of: June 26, 2019  Content Version: 12.2  © 6704-8314 [employer]. Care instructions adapted under license by [employer] (which disclaims liability or warranty for this information). If you have questions about a medical condition or this instruction, always ask your healthcare professional. [place] [address] any warranty or liability for your use of this information. Patient Education        Lightheadedness or Faintness: Care Instructions  Your Care Instructions  Lightheadedness is a feeling that you are about to faint or \"pass out. \" You do not feel as if you or your surroundings are moving. It is different from vertigo, which is the feeling that you or things around you are spinning or tilting.   Lightheadedness usually goes away or gets better when you lie down. If lightheadedness gets worse, it can lead to a fainting spell. It is common to feel lightheaded from time to time. Lightheadedness usually is not caused by a serious problem. It often is caused by a short-lasting drop in blood pressure and blood flow to your head that occurs when you get up too quickly from a seated or lying position. Follow-up care is a key part of your treatment and safety. Be sure to make and go to all appointments, and call your doctor if you are having problems. It's also a good idea to know your test results and keep a list of the medicines you take. How can you care for yourself at home? · Lie down for 1 or 2 minutes when you feel lightheaded. After lying down, sit up slowly and remain sitting for 1 to 2 minutes before slowly standing up. · Avoid movements, positions, or activities that have made you lightheaded in the past.  · Get plenty of rest, especially if you have a cold or flu, which can cause lightheadedness. · Make sure you drink plenty of fluids, especially if you have a fever or have been sweating. · Do not drive or put yourself and others in danger while you feel lightheaded. When should you call for help? Call 911 anytime you think you may need emergency care. For example, call if:    · You have symptoms of a stroke. These may include:  ? Sudden numbness, tingling, weakness, or loss of movement in your face, arm, or leg, especially on only one side of your body. ? Sudden vision changes. ? Sudden trouble speaking. ? Sudden confusion or trouble understanding simple statements. ? Sudden problems with walking or balance. ? A sudden, severe headache that is different from past headaches.     · You have symptoms of a heart attack. These may include:  ? Chest pain or pressure, or a strange feeling in the chest.  ? Sweating. ? Shortness of breath. ? Nausea or vomiting.   ? Pain, pressure, or a strange feeling in the back, neck, jaw, or upper belly or in one or both shoulders or arms. ? Lightheadedness or sudden weakness. ? A fast or irregular heartbeat. After you call 911, the  may tell you to chew 1 adult-strength or 2 to 4 low-dose aspirin. Wait for an ambulance. Do not try to drive yourself.    Watch closely for changes in your health, and be sure to contact your doctor if:    · Your lightheadedness gets worse or does not get better with home care. Where can you learn more? Go to http://tran-fritz.info/. Enter D957 in the search box to learn more about \"Lightheadedness or Faintness: Care Instructions. \"  Current as of: June 26, 2019  Content Version: 12.2  © 8754-1528 [employer]. Care instructions adapted under license by [employer] (which disclaims liability or warranty for this information). If you have questions about a medical condition or this instruction, always ask your healthcare professional. [place] [address] any warranty or liability for your use of this information.

## 2019-10-29 ENCOUNTER — TELEPHONE (OUTPATIENT)
Dept: PRIMARY CARE CLINIC | Age: 39
End: 2019-10-29

## 2019-10-29 NOTE — TELEPHONE ENCOUNTER
Spoke with Mina Bearden from [facility] and advised that they need to sign a medical release form and request records from previous PCP, Keatonmehran Reveles so that we will know why pt is on propanolol. We can not d/c medication before we know why pt is taking it. Mina Bearden verbalized her understanding. States that she will notify Antisha and they will find out more information.

## 2019-10-29 NOTE — TELEPHONE ENCOUNTER
Brit Gómez states that they were unable to find out from previous PCP why pt was on propanolol. States that they were unable to provide her with any information and if  does not think pt needs to be on this medication, can it be d/c? Otherwise, they need the name again of the previous PCP. States that  provided her with this information.

## 2019-10-29 NOTE — TELEPHONE ENCOUNTER
Group home Needs a discharge order for propranolol can be sent via fax [phone] phone [phone] Lindsay

## 2019-11-13 ENCOUNTER — OFFICE VISIT (OUTPATIENT)
Dept: PRIMARY CARE CLINIC | Age: 39
End: 2019-11-13

## 2019-11-13 VITALS
BODY MASS INDEX: 23.37 KG/M2 | TEMPERATURE: 98.4 F | DIASTOLIC BLOOD PRESSURE: 70 MMHG | WEIGHT: 157.8 LBS | OXYGEN SATURATION: 96 % | HEIGHT: 69 IN | SYSTOLIC BLOOD PRESSURE: 109 MMHG | RESPIRATION RATE: 18 BRPM | HEART RATE: 78 BPM

## 2019-11-13 DIAGNOSIS — E03.9 ACQUIRED HYPOTHYROIDISM: Primary | ICD-10-CM

## 2019-11-13 DIAGNOSIS — Z23 ENCOUNTER FOR IMMUNIZATION: ICD-10-CM

## 2019-11-13 DIAGNOSIS — F70 MILD INTELLECTUAL DISABILITY: ICD-10-CM

## 2019-11-13 DIAGNOSIS — L70.9 ACNE, UNSPECIFIED ACNE TYPE: ICD-10-CM

## 2019-11-13 DIAGNOSIS — E55.9 VITAMIN D DEFICIENCY: ICD-10-CM

## 2019-11-13 NOTE — PROGRESS NOTES
Subjective:     Chief Complaint   Patient presents with    Other     6 month follow up     Immunization/Injection        He  is a 42y.o. year old male with  medical HX is significant for schizoaffective disorder, depressive disorder, hypothyroidism, nystagmus, BL hearing loss w/ BL hearing aids, asthma, mild ID. He is here with his caregiver. He has been following up with psychiatrist regularly. Hypothyroid : He is currently on synthroid 100 mcg daily. Lab Results   Component Value Date/Time    TSH 0.503 05/06/2019 11:25 AM    T4, Free 1.30 05/06/2019 11:25 AM          Acne: Well controlled with Clindagel.      He has been following up with urologist regularly and taking Flomax 0.4 mg.      Currently On Vit D 2,000 iu daily.      I Checked his current med reconcile brought by the caregiver today and noticed that propranolol is not listed in there but received call from group home many times to send refill of propranolol. I asked them to find out why propranolol was prescribed by his last PCP but I never received any answer yet. Pertinent items are noted in HPI.   Objective:     Vitals:    11/13/19 1129   BP: 109/70   Pulse: 78   Resp: 18   Temp: 98.4 °F (36.9 °C)   TempSrc: Oral   SpO2: 96%   Weight: 157 lb 12.8 oz (71.6 kg)   Height: 5' 9\" (1.753 m)       Physical Examination: General appearance - alert, well appearing, and in no distress, oriented to person, place, and time and normal appearing weight  Mental status - alert, oriented to person, place, and time, normal mood, behavior, speech, dress, motor activity, and thought processes  Chest - clear to auscultation, no wheezes, rales or rhonchi, symmetric air entry  Heart - normal rate, regular rhythm, normal S1, S2, no murmurs, rubs, clicks or gallops    No Known Allergies   Social History     Socioeconomic History    Marital status: SINGLE     Spouse name: Not on file    Number of children: Not on file    Years of education: Not on file    Highest education level: Not on file   Tobacco Use    Smoking status: Current Every Day Smoker     Packs/day: 0.25    Smokeless tobacco: Current User   Substance and Sexual Activity    Alcohol use: No    Drug use: No      No family history on file. History reviewed. No pertinent surgical history. Past Medical History:   Diagnosis Date    Depressive disorder     Hypothyroid     Schizoaffective disorder (HCC)     Uses hearing aid       Current Outpatient Medications   Medication Sig Dispense Refill    clindamycin (CLINDAGEL) 1 % topical gel Apply  to affected area two (2) times a day. use thin film on affected area 1 mL 3    sunscreen 15 SPF lotion Use it as needed. 1 Bottle 1    DOC-Q-LACE 100 mg capsule TAKE (1) CAPSULE BY MOUTH DAILY 30 Cap PRN    levothyroxine (SYNTHROID) 100 mcg tablet TAKE 1 TABLET BY MOUTH DAILY 30 Tab PRN    ibuprofen (MOTRIN) 600 mg tablet Take 1 Tab by mouth every twelve (12) hours every twelve (12) hours. With food for back pain 10 Tab 0    ciprofloxacin-dexamethasone (CIPRODEX) 0.3-0.1 % otic suspension Administer 4 Drops in left ear two (2) times a day.  tamsulosin (FLOMAX) 0.4 mg capsule Take 0.4 mg by mouth daily.  albuterol sulfate 90 mcg/actuation aepb Take  by inhalation as needed.  acetaminophen (TYLENOL EXTRA STRENGTH) 500 mg tablet Take 1 Tab by mouth every six (6) hours as needed for Pain or Fever. 30 Tab 2    ibuprofen (MOTRIN) 600 mg tablet Take 1 Tab by mouth every eight (8) hours as needed for Pain. 30 Tab 0    cholecalciferol, vitamin D3, 2,000 unit tab Take 200 Units by mouth.  gabapentin (NEURONTIN) 400 mg capsule Take 400 mg by mouth three (3) times daily.  lithium carbonate (LITHOBID) 150 mg capsule Take 150 mg by mouth daily.  lithium carbonate (LITHONATE) 300 mg capsule Take 300 mg by mouth daily. morning      lithium carbonate (LITHONATE) 300 mg capsule Take 300 mg by mouth daily.  At night      OLANZapine (ZYPREXA) 15 mg tablet Take 15 mg by mouth nightly.  propranolol (INDERAL) 20 mg tablet Take 20 mg by mouth two (2) times a day.  QUEtiapine (SEROQUEL) 400 mg tablet Take 400 mg by mouth two (2) times a day.  sertraline (ZOLOFT) 50 mg tablet Take 50 mg by mouth daily. Assessment/ Plan:   Diagnoses and all orders for this visit:    1. Acquired hypothyroidism  -     TSH AND FREE T4               Continue synthroid. Will notify if any change needed. 2. Mild intellectual disability       - stable. 3. Acne, unspecified acne type      - well controlled   4. Vitamin D deficiency  -     VITAMIN D, 25 HYDROXY    5. Encounter for immunization  -     INFLUENZA VIRUS VAC QUAD,SPLIT,PRESV FREE SYRINGE IM           Medication risks/benefits/costs/interactions/alternatives discussed with patient. Advised patient to call back or return to office if symptoms worsen/change/persist. If patient cannot reach us or should anything more severe/urgent arise he/she should proceed directly to the nearest emergency department. Discussed expected course/resolution/complications of diagnosis in detail with patient. Patient given a written after visit summary which includes her diagnoses, current medications and vitals. Patient expressed understanding with the diagnosis and plan. Follow-up and Dispositions    · Return in about 6 months (around 5/13/2020), or if symptoms worsen or fail to improve, for complete physical  and fasting blood work. Adalgisa Kelly

## 2019-11-13 NOTE — PROGRESS NOTES
Chief Complaint   Patient presents with    Other     6 month follow up     Immunization/Injection     1. Have you been to the ER, urgent care clinic since your last visit? Hospitalized since your last visit? Yes When: Nov 2019 [facility] Reason for visit: Syncope    2. Have you seen or consulted any other health care providers outside of the [address] [place] [address] since your last visit? Include any pap smears or colon screening.  No

## 2019-11-14 LAB
T4 FREE SERPL-MCNC: 1.13 NG/DL (ref 0.82–1.77)
TSH SERPL DL<=0.005 MIU/L-ACNC: 0.48 UIU/ML (ref 0.45–4.5)

## 2019-11-15 NOTE — PROGRESS NOTES
Please mail letter:    Thyroid level is in normal range. Continue synthroid 100 mcg . Follow up in 6 months.

## 2019-11-19 ENCOUNTER — TELEPHONE (OUTPATIENT)
Dept: PRIMARY CARE CLINIC | Age: 39
End: 2019-11-19

## 2019-11-19 NOTE — TELEPHONE ENCOUNTER
Attempted to reach pharmacy. We still have not heard back from group in regards to why pt was on this medication. Was prescribed by pt's previous PCP. Group home was told multiple times to contact previous PCP to get pt's records so we can see why it was prescribed. Pharmacy phone went to voicemail. Will try again later.

## 2019-11-20 NOTE — TELEPHONE ENCOUNTER
LVM with pharmacy tech and informed her of details from previous message.  She verbalized her understanding and states that she will inform Kaylah Tee the pharmacist.

## 2019-11-26 ENCOUNTER — TELEPHONE (OUTPATIENT)
Dept: PRIMARY CARE CLINIC | Age: 39
End: 2019-11-26

## 2019-11-26 NOTE — TELEPHONE ENCOUNTER
----- Message from Micromidas sent at 11/25/2019  3:20 PM EST -----  Regarding: Dr. Pat Han (if not patient):      Relationship of caller (if not patient): Inman Good from the patient's group home      Best contact number(s):[phone]      Name of medication and dosage if known:      Is patient out of this medication (yes/no): yes      Pharmacy name: [address] listed in chart? (yes/no): yes  Pharmacy phone number:      Details to clarify the request: The insurance is requesting a prior authorization for the medication Propianol sent to [id number]

## 2019-11-26 NOTE — TELEPHONE ENCOUNTER
Informed Karely Tejada that we have told multiple people that we need records from pt's previous PCP to know why he is on this medication. Will fax a medical release to Karely Tejada at [phone]. She will send the release form to pt's previous PCP so that they can send us the records.

## 2019-12-10 RX ORDER — PROPRANOLOL HYDROCHLORIDE 20 MG/1
20 TABLET ORAL 2 TIMES DAILY
OUTPATIENT
Start: 2019-12-10

## 2019-12-10 NOTE — TELEPHONE ENCOUNTER
Informed Ms. Landon Marin that we still not have received the records from pt's previous PCP as to why he is on this medication. Advised that I spoke with Galina Johnson on 11/26/19 as well as multiple other people from their office and told them this already. On 11/26/19 I faxed Galina Johnson a medical release form with our fax number so that pt's mother could sign it and they can fax it to pt's previous PCP so they can give us his records. Ms. Landon Marin verbalized her understanding. Will follow up.

## 2019-12-17 NOTE — TELEPHONE ENCOUNTER
Group home is requesting a refill on propenol. Stated they were waiting for his record.  It has been received and put on dr liriano desk

## 2019-12-20 RX ORDER — PROPRANOLOL HYDROCHLORIDE 20 MG/1
20 TABLET ORAL 2 TIMES DAILY
Qty: 60 TAB | Refills: 0 | Status: SHIPPED | OUTPATIENT
Start: 2019-12-20 | End: 2020-01-14

## 2019-12-20 NOTE — TELEPHONE ENCOUNTER
Medical records I received was not clear about the indication of Propranolol. I wonder if the med was started by any 0f his psychiatrist for anxiety.  Can you ask group home to talk with his current and previous psychiatrist?

## 2019-12-20 NOTE — TELEPHONE ENCOUNTER
Spoke with Bryce Mcneal from [facility]. She does not understanding why  can't assess the patient and see if he needs to be on the medication. States that she does not understand the unnecessary footwork. States that she will send a release and get the records from pt's current psychiatrist and previous to see if it was prescribed for any reason. Advised that a 30 day supply was sent in the meantime. She verbalized her understanding.

## 2020-01-14 RX ORDER — PROPRANOLOL HYDROCHLORIDE 20 MG/1
20 TABLET ORAL DAILY
Qty: 30 TAB | Refills: 0 | Status: SHIPPED | OUTPATIENT
Start: 2020-01-14 | End: 2020-01-14 | Stop reason: SDUPTHER

## 2020-01-14 RX ORDER — PROPRANOLOL HYDROCHLORIDE 20 MG/1
20 TABLET ORAL
Qty: 30 TAB | Refills: 0 | Status: SHIPPED | OUTPATIENT
Start: 2020-01-14 | End: 2020-03-13

## 2020-01-14 NOTE — TELEPHONE ENCOUNTER
Group home notified. States that we have to send in a new prescription specifying the time pt should take it. Morning, noon, or bedtime. States that we have to be specific.

## 2020-01-14 NOTE — TELEPHONE ENCOUNTER
Please ask patient to have follow up with regarding this med ( refer to last notes about the confusion with this med). We tried to find out the indication of the med, Don't know who started and why. Group home did not have any clue either. Med records that we received was not helpful. I have sent propranolol one tab daily to see if he has any reaction from cutting down to once/day.  I need to see him in 3 weeks for med eval.

## 2020-02-13 ENCOUNTER — OFFICE VISIT (OUTPATIENT)
Dept: PRIMARY CARE CLINIC | Age: 40
End: 2020-02-13

## 2020-02-13 VITALS
DIASTOLIC BLOOD PRESSURE: 67 MMHG | TEMPERATURE: 97.9 F | WEIGHT: 162 LBS | HEART RATE: 96 BPM | SYSTOLIC BLOOD PRESSURE: 114 MMHG | BODY MASS INDEX: 23.99 KG/M2 | RESPIRATION RATE: 16 BRPM | OXYGEN SATURATION: 98 % | HEIGHT: 69 IN

## 2020-02-13 DIAGNOSIS — R09.81 NASAL CONGESTION: Primary | ICD-10-CM

## 2020-02-13 RX ORDER — FLUTICASONE PROPIONATE 50 MCG
2 SPRAY, SUSPENSION (ML) NASAL
Qty: 1 BOTTLE | Refills: 0 | Status: SHIPPED | OUTPATIENT
Start: 2020-02-13 | End: 2020-06-12

## 2020-02-13 NOTE — PROGRESS NOTES
Subjective:     Chief Complaint   Patient presents with    Nasal Congestion        He  is a 42y.o. year old male with  medical HX is significant for schizoaffective disorder, depressive disorder, hypothyroidism, nystagmus, BL hearing loss w/ BL hearing aids, asthma, mild ID is here with his caregiver with a complain of having nasal congestion. Reports that his boss notice him sniffling at work so he was asked to have a follow up. Denies any fever, cough, sore throat. Pertinent items are noted in HPI. Objective:     Vitals:    02/13/20 1218   Pulse: 96   Resp: 16   Temp: 97.9 °F (36.6 °C)   TempSrc: Oral   SpO2: 98%   Weight: 162 lb (73.5 kg)   Height: 5' 9\" (1.753 m)       Physical Examination: General appearance - alert, well appearing, and in no distress, oriented to person, place and normal appearing weight  Mental status - alert, oriented to person, place, and normal mood, behavior, speech, dress, motor activity, and thought processes  Ears - bilateral TM's and external ear canals normal, hearing aid. Nose - mucosal congestion and mucosal erythema  Mouth - mucous membranes moist, pharynx normal without lesions  Neck - supple, no significant adenopathy  Chest - clear to auscultation, no wheezes, rales or rhonchi, symmetric air entry  Heart - normal rate, regular rhythm, normal S1, S2, no murmurs, rubs, clicks or gallops    No Known Allergies   Social History     Socioeconomic History    Marital status: SINGLE     Spouse name: Not on file    Number of children: Not on file    Years of education: Not on file    Highest education level: Not on file   Tobacco Use    Smoking status: Current Every Day Smoker     Packs/day: 0.25    Smokeless tobacco: Current User   Substance and Sexual Activity    Alcohol use: No    Drug use: No      No family history on file. History reviewed. No pertinent surgical history.    Past Medical History:   Diagnosis Date    Depressive disorder     Hypothyroid     Schizoaffective disorder (HCC)     Uses hearing aid       Current Outpatient Medications   Medication Sig Dispense Refill    propranolol (INDERAL) 20 mg tablet Take 1 Tab by mouth every morning. . 30 Tab 0    clindamycin (CLINDAGEL) 1 % topical gel Apply  to affected area two (2) times a day. use thin film on affected area 1 mL 3    DOC-Q-LACE 100 mg capsule TAKE (1) CAPSULE BY MOUTH DAILY 30 Cap PRN    levothyroxine (SYNTHROID) 100 mcg tablet TAKE 1 TABLET BY MOUTH DAILY 30 Tab PRN    tamsulosin (FLOMAX) 0.4 mg capsule Take 0.4 mg by mouth daily.  gabapentin (NEURONTIN) 400 mg capsule Take 800 mg by mouth two (2) times a day.  lithium carbonate (LITHOBID) 150 mg capsule Take 450 mg by mouth every morning.  lithium carbonate (LITHONATE) 300 mg capsule Take 600 mg by mouth nightly. morning       OLANZapine (ZYPREXA) 15 mg tablet Take 30 mg by mouth nightly.  QUEtiapine (SEROQUEL) 400 mg tablet Take 400 mg by mouth two (2) times a day.  sertraline (ZOLOFT) 50 mg tablet Take 50 mg by mouth nightly.  sunscreen 15 SPF lotion Use it as needed. 1 Bottle 1    ciprofloxacin-dexamethasone (CIPRODEX) 0.3-0.1 % otic suspension Administer 4 Drops in left ear two (2) times a day.  albuterol sulfate 90 mcg/actuation aepb Take  by inhalation as needed.  acetaminophen (TYLENOL EXTRA STRENGTH) 500 mg tablet Take 1 Tab by mouth every six (6) hours as needed for Pain or Fever. 30 Tab 2    ibuprofen (MOTRIN) 600 mg tablet Take 1 Tab by mouth every eight (8) hours as needed for Pain. 30 Tab 0    cholecalciferol, vitamin D3, 2,000 unit tab Take 200 Units by mouth. Assessment/ Plan:   Diagnoses and all orders for this visit:    1. Nasal congestion  -    Start  fluticasone propionate (FLONASE) 50 mcg/actuation nasal spray; 2 Sprays by Both Nostrils route two (2) times daily as needed (nasal congestion. ).            Medication risks/benefits/costs/interactions/alternatives discussed with patient. Advised patient to call back or return to office if symptoms worsen/change/persist. If patient cannot reach us or should anything more severe/urgent arise he/she should proceed directly to the nearest emergency department. Discussed expected course/resolution/complications of diagnosis in detail with patient. Patient given a written after visit summary which includes her diagnoses, current medications and vitals. Patient expressed understanding with the diagnosis and plan. Follow-up and Dispositions    · Return if symptoms worsen or fail to improve.

## 2020-04-22 DIAGNOSIS — L70.9 ACNE, UNSPECIFIED ACNE TYPE: ICD-10-CM

## 2020-04-22 RX ORDER — CLINDAMYCIN PHOSPHATE 10 MG/G
GEL TOPICAL 2 TIMES DAILY
Qty: 1 ML | Refills: 3 | Status: SHIPPED | OUTPATIENT
Start: 2020-04-22 | End: 2022-08-15

## 2020-06-12 DIAGNOSIS — R09.81 NASAL CONGESTION: ICD-10-CM

## 2020-06-12 RX ORDER — FLUTICASONE PROPIONATE 50 MCG
SPRAY, SUSPENSION (ML) NASAL
Qty: 16 G | Refills: 11 | Status: SHIPPED | OUTPATIENT
Start: 2020-06-12 | End: 2022-01-25

## 2020-08-11 ENCOUNTER — OFFICE VISIT (OUTPATIENT)
Dept: URGENT CARE | Age: 40
End: 2020-08-11
Payer: MEDICAID

## 2020-08-11 VITALS — RESPIRATION RATE: 17 BRPM | HEART RATE: 83 BPM | TEMPERATURE: 99.3 F | OXYGEN SATURATION: 96 %

## 2020-08-11 DIAGNOSIS — Z20.822 EXPOSURE TO COVID-19 VIRUS: Primary | ICD-10-CM

## 2020-08-11 PROCEDURE — 99202 OFFICE O/P NEW SF 15 MIN: CPT | Performed by: NURSE PRACTITIONER

## 2020-08-11 NOTE — PROGRESS NOTES
Subjective: (As above and below)       This patient was seen in [facility] at [address] [place] [address] Urgent Care while in their vehicle due to COVID-19 pandemic with PPE and focused examination in order to decrease community viral transmission. The patient/guardian gave verbal consent to treat. Chief Complaint   Patient presents with    Covid Testing     No sx, positive COVID exposure     Ce Barclay is a 47 y.o. male who presents for COVID-19 Exposure and testing. Known contact with: covid 19  Currently has not tried any therapies and denies any symptoms at this point in time. Feels well otherwise. ROS- negative for appetite or behavior change  Review of Systems - negative except as listed above    Reviewed PmHx, RxHx, FmHx, SocHx, AllgHx and updated in chart. History reviewed. No pertinent family history. Past Medical History:   Diagnosis Date    Depressive disorder     Hypothyroid     Schizoaffective disorder (HCC)     Uses hearing aid       Social History     Socioeconomic History    Marital status: SINGLE     Spouse name: Not on file    Number of children: Not on file    Years of education: Not on file    Highest education level: Not on file   Tobacco Use    Smoking status: Current Every Day Smoker     Packs/day: 0.25    Smokeless tobacco: Current User   Substance and Sexual Activity    Alcohol use: No    Drug use: No          Current Outpatient Medications   Medication Sig    fluticasone propionate (FLONASE) 50 mcg/actuation nasal spray BLOW NOSE: THEN USE 2 SPRAYS IN EACH NOSTRIL TWICE DAILY AS NEEDED FOR CONGESTION    propranoloL (INDERAL) 20 mg tablet TAKE 1 TABLET BY MOUTH DAILY    sunscreen 15 SPF lotion Use it as needed.  clindamycin (CLINDAGEL) 1 % topical gel Apply  to affected area two (2) times a day.  use thin film on affected area    levothyroxine (SYNTHROID) 100 mcg tablet TAKE 1 TABLET BY MOUTH DAILY     mg capsule TAKE 1 CAPSULE BY MOUTH DAILY    ciprofloxacin-dexamethasone (CIPRODEX) 0.3-0.1 % otic suspension Administer 4 Drops in left ear two (2) times a day.  tamsulosin (FLOMAX) 0.4 mg capsule Take 0.4 mg by mouth daily.  albuterol sulfate 90 mcg/actuation aepb Take  by inhalation as needed.  acetaminophen (TYLENOL EXTRA STRENGTH) 500 mg tablet Take 1 Tab by mouth every six (6) hours as needed for Pain or Fever.  ibuprofen (MOTRIN) 600 mg tablet Take 1 Tab by mouth every eight (8) hours as needed for Pain.  cholecalciferol, vitamin D3, 2,000 unit tab Take 200 Units by mouth.  gabapentin (NEURONTIN) 400 mg capsule Take 800 mg by mouth two (2) times a day.  lithium carbonate (LITHOBID) 150 mg capsule Take 450 mg by mouth every morning.  lithium carbonate (LITHONATE) 300 mg capsule Take 600 mg by mouth nightly. morning     OLANZapine (ZYPREXA) 15 mg tablet Take 30 mg by mouth nightly.  QUEtiapine (SEROQUEL) 400 mg tablet Take 400 mg by mouth two (2) times a day.  sertraline (ZOLOFT) 50 mg tablet Take 50 mg by mouth nightly. No current facility-administered medications for this visit. Objective:     Vitals:    08/11/20 1538   Pulse: 83   Resp: 17   Temp: 99.3 °F (37.4 °C)   SpO2: 96%       Physical Exam  General appearance - appears well hydrated and does not appear toxic, no acute distress  Eyes - EOMs intact. Non injected. No scleral icterus   Ears - no external swelling  Nose - No purulent drainage  Neck/Lymphatics - trachea midline, full AROM  Chest - normal breathing effort. No active cough heard. No audible wheezing. Heart - HR-see vitals  Skin - no observable rashes or pallor  Neurologic- alert and oriented x 3  Psychiatric- normal mood, behavior and though content. Assessment/ Plan:     1.  Exposure to COVID-19 virus    - NOVEL CORONAVIRUS (COVID-19)      Will test for COVID-19 given exposure  Supportive home care reviewed for any development of mild symptoms - tylenol, maintain adequate fluid intake, deep breathing exercises  Self isolate/quarantine advised based on recommendations in current CDC guidelines. Follow up: We have reviewed, in detail, particular presentations/worsening/concerning signs and symptoms that may warrant seeking immediate care in the ED setting and patient verbalized being aware of what to watch for. For other non-severe changes, non-improvement or questions, patient aware to contact PCP office or consider a virtual online medical consultation. Reviewed with him that COVID-19 pandemic is an evolving situation with rapidly changing recommendations & guidelines. Medical decisions are made based on the the best information available at the time.   Recommended he stay tuned for updates published by trusted sources and to advise your PCP of any unexpected changes in clinical condition     Dejon Mahmood, NP

## 2020-08-13 LAB — SARS-COV-2, NAA: NOT DETECTED

## 2020-10-13 ENCOUNTER — OFFICE VISIT (OUTPATIENT)
Dept: PRIMARY CARE CLINIC | Age: 40
End: 2020-10-13
Payer: MEDICAID

## 2020-10-13 VITALS
BODY MASS INDEX: 22.96 KG/M2 | SYSTOLIC BLOOD PRESSURE: 88 MMHG | HEART RATE: 58 BPM | TEMPERATURE: 97.5 F | OXYGEN SATURATION: 97 % | HEIGHT: 69 IN | RESPIRATION RATE: 18 BRPM | WEIGHT: 155 LBS | DIASTOLIC BLOOD PRESSURE: 54 MMHG

## 2020-10-13 DIAGNOSIS — E03.9 ACQUIRED HYPOTHYROIDISM: ICD-10-CM

## 2020-10-13 DIAGNOSIS — E55.9 VITAMIN D DEFICIENCY: ICD-10-CM

## 2020-10-13 DIAGNOSIS — Z11.1 SCREENING-PULMONARY TB: ICD-10-CM

## 2020-10-13 DIAGNOSIS — Z00.00 WELL ADULT ON ROUTINE HEALTH CHECK: ICD-10-CM

## 2020-10-13 DIAGNOSIS — F70 MILD INTELLECTUAL DISABILITY: ICD-10-CM

## 2020-10-13 DIAGNOSIS — Z00.00 WELL ADULT ON ROUTINE HEALTH CHECK: Primary | ICD-10-CM

## 2020-10-13 DIAGNOSIS — Z23 NEEDS FLU SHOT: ICD-10-CM

## 2020-10-13 PROCEDURE — 90686 IIV4 VACC NO PRSV 0.5 ML IM: CPT | Performed by: FAMILY MEDICINE

## 2020-10-13 PROCEDURE — 99214 OFFICE O/P EST MOD 30 MIN: CPT | Performed by: FAMILY MEDICINE

## 2020-10-13 PROCEDURE — 99395 PREV VISIT EST AGE 18-39: CPT | Performed by: FAMILY MEDICINE

## 2020-10-13 PROCEDURE — 90471 IMMUNIZATION ADMIN: CPT | Performed by: FAMILY MEDICINE

## 2020-10-13 NOTE — PROGRESS NOTES
Oseas Spears is a 47 y.o. male  Chief Complaint   Patient presents with    Complete Physical     Health Maintenance Due   Topic Date Due    Pneumococcal 0-64 years (1 of 1 - PPSV23) [birth year]1988    Flu Vaccine (1) 09/01/2020     Visit Vitals  BP (!) 88/54 (BP 1 Location: Right arm, BP Patient Position: Sitting)   Pulse (!) 58   Temp 97.5 °F (36.4 °C) (Temporal)   Resp 18   Ht 5' 9\" (1.753 m)   Wt 155 lb (70.3 kg)   SpO2 97%   BMI 22.89 kg/m²     1. Have you been to the ER, urgent care clinic since your last visit? Hospitalized since your last visit? No    2. Have you seen or consulted any other health care providers outside of the [address] [place] [address] since your last visit? Include any pap smears or colon screening.  No

## 2020-10-13 NOTE — PROGRESS NOTES
Subjective:   Yin Shultz is a 47 y.o. male presenting for his annual checkup as well as follow up on chronic conditions. He is here with his group home staff member Joel Ramos  His  medical HX is significant for schizoaffective disorder, depressive disorder, hypothyroidism, nystagmus, BL hearing loss w/ BL hearing aids, asthma, mild ID. He has been following up with psychiatrist regularly.      Hypothyroid:  He is currently on synthroid 100 mcg daily.      Acne: Well controlled with Clindagel. Patient is on Propranolol 20 mg. Initially when I saw him he was already on Propranolol. Ms. Robert Butterfield does not know why he is on Propranolol. I have contacted the group home several times to find out why he is on propranolol but no clear answer. Is it for anxiety, HA, palpitation? Not sure.     He has been following up with urologist regularly and taking Flomax 0.4 mg.      Currently On Vit D 2,000 iu daily.      Need  TB screen.      Need sunscreen refill.      Brought [employer] form for me to filled out.         ROS:  Feeling well. No dyspnea or chest pain on exertion. No abdominal pain, change in bowel habits, black or bloody stools. No urinary tract or prostatic symptoms. No neurological complaints. Specific concerns today: refer to hospitals. [facility]     Patient Active Problem List   Diagnosis Code    Mild intellectual disability F70    Schizoaffective disorder (HCC) F25.9    Acute left-sided low back pain without sciatica M54.5    Acquired hypothyroidism E03.9    Acne L70.9    Nystagmus H55.00     Current Outpatient Medications   Medication Sig Dispense Refill    levothyroxine (SYNTHROID) 100 mcg tablet TAKE 1 TABLET BY MOUTH DAILY 30 Tab 3    propranoloL (INDERAL) 20 mg tablet TAKE 1 TABLET BY MOUTH DAILY 30 Tab 4    fluticasone propionate (FLONASE) 50 mcg/actuation nasal spray BLOW NOSE: THEN USE 2 SPRAYS IN EACH NOSTRIL TWICE DAILY AS NEEDED FOR CONGESTION 16 g 11    sunscreen 15 SPF lotion Use it as needed. 1 Bottle 1    clindamycin (CLINDAGEL) 1 % topical gel Apply  to affected area two (2) times a day. use thin film on affected area 1 mL 3     mg capsule TAKE 1 CAPSULE BY MOUTH DAILY 30 Cap 11    ciprofloxacin-dexamethasone (CIPRODEX) 0.3-0.1 % otic suspension Administer 4 Drops in left ear two (2) times a day.  tamsulosin (FLOMAX) 0.4 mg capsule Take 0.4 mg by mouth daily.  acetaminophen (TYLENOL EXTRA STRENGTH) 500 mg tablet Take 1 Tab by mouth every six (6) hours as needed for Pain or Fever. 30 Tab 2    ibuprofen (MOTRIN) 600 mg tablet Take 1 Tab by mouth every eight (8) hours as needed for Pain. 30 Tab 0    gabapentin (NEURONTIN) 400 mg capsule Take 800 mg by mouth two (2) times a day.  lithium carbonate (LITHOBID) 150 mg capsule Take 450 mg by mouth every morning.  lithium carbonate (LITHONATE) 300 mg capsule Take 600 mg by mouth nightly. morning       OLANZapine (ZYPREXA) 15 mg tablet Take 30 mg by mouth nightly.  QUEtiapine (SEROQUEL) 400 mg tablet Take 400 mg by mouth two (2) times a day.  sertraline (ZOLOFT) 50 mg tablet Take 50 mg by mouth nightly.  albuterol sulfate 90 mcg/actuation aepb Take  by inhalation as needed.  cholecalciferol, vitamin D3, 2,000 unit tab Take 200 Units by mouth. No Known Allergies  Past Medical History:   Diagnosis Date    Depressive disorder     Hypothyroid     Schizoaffective disorder ([facility] [address]     Uses hearing aid      No past surgical history on file.      Lab Results   Component Value Date/Time    WBC 11.5 (H) 10/16/2019 02:24 PM    HGB 15.6 10/16/2019 02:24 PM    HCT 47.7 10/16/2019 02:24 PM    PLATELET 589 41/17/3188 02:24 PM    MCV 90.9 10/16/2019 02:24 PM     Lab Results   Component Value Date/Time    Cholesterol, total 155 05/06/2019 11:25 AM    HDL Cholesterol 60 05/06/2019 11:25 AM    LDL, calculated 79 05/06/2019 11:25 AM    Triglyceride 81 05/06/2019 11:25 AM     Lab Results   Component Value Date/Time    ALT (SGPT) 23 10/16/2019 02:24 PM    Alk. phosphatase 75 10/16/2019 02:24 PM    Bilirubin, total 0.3 10/16/2019 02:24 PM    Albumin 3.9 10/16/2019 02:24 PM    Protein, total 7.2 10/16/2019 02:24 PM    PLATELET 190 83/60/9042 02:24 PM     Lab Results   Component Value Date/Time    GFR est non-AA >60 10/16/2019 02:24 PM    GFR est AA >60 10/16/2019 02:24 PM    Creatinine 0.74 10/16/2019 02:24 PM    BUN 9 10/16/2019 02:24 PM    Sodium 140 10/16/2019 02:24 PM    Potassium 3.6 10/16/2019 02:24 PM    Chloride 111 (H) 10/16/2019 02:24 PM    CO2 25 10/16/2019 02:24 PM     Lab Results   Component Value Date/Time    TSH 0.482 11/13/2019 01:51 PM    T4, Free 1.13 11/13/2019 01:51 PM      No results found for: HBA1C, ARQ2VJRV, HGBE8, HGR3ESHB, MVU8LLGK      Objective:     Visit Vitals  BP (!) 88/54 (BP 1 Location: Right arm, BP Patient Position: Sitting)   Pulse (!) 58   Temp 97.5 °F (36.4 °C) (Temporal)   Resp 18   Ht 5' 9\" (1.753 m)   Wt 155 lb (70.3 kg)   SpO2 97%   BMI 22.89 kg/m²     The patient appears well, alert, oriented x 2, in no distress. Missing tooth ( working on getting denture)  Wears hearing aid in left . Neck supple. No adenopathy or thyromegaly. AVILA. Lungs are clear, good air entry, no wheezes, rhonchi or rales. S1 and S2 normal, no murmurs, regular rate and rhythm. Abdomen is soft without tenderness, guarding, mass or organomegaly.  exam: deferred. .  Extremities show no edema, normal peripheral pulses. Neurological is normal without focal findings. Assessment/Plan:   healthy adult male  bring BP log to office visit, follow low fat diet, follow low salt diet, continue present plan, routine labs ordered, call if any problems. ICD-10-CM ICD-9-CM    1. Well adult on routine health check  Z00.00 V70.0 CBC WITH AUTOMATED DIFF      METABOLIC PANEL, COMPREHENSIVE      LIPID PANEL   2. Screening-pulmonary TB  Z11.1 V74.1 QUANTIFERON-TB PLUS(CLIENT INCUB.)   3.  Acquired hypothyroidism E03.9 244.9 THYROID CASCADE PROFILE   4. Mild intellectual disability  F70 317    5. Vitamin D deficiency  E55.9 268.9 VITAMIN D, 25 HYDROXY   6. Needs flu shot  Z23 V04.81 INFLUENZA VIRUS VAC QUAD,SPLIT,PRESV FREE SYRINGE IM     Diagnoses and all orders for this visit:    1. Well adult on routine health check  -     CBC WITH AUTOMATED DIFF; Future  -     METABOLIC PANEL, COMPREHENSIVE; Future  -     LIPID PANEL; Future    2. Screening-pulmonary TB  -     QUANTIFERON-TB PLUS(CLIENT INCUB.); Future    3. Acquired hypothyroidism  -     THYROID CASCADE PROFILE; Future    4. Mild intellectual disability    5. Vitamin D deficiency  -     VITAMIN D, 25 HYDROXY; Future    6. Needs flu shot  -     INFLUENZA VIRUS VAC QUAD,SPLIT,PRESV FREE SYRINGE IM    Not sure why he is on Propranolol. Anxiety/ tachycardia/migraine? BP is symptomatically low. Last EKG showed RBBB. No Hx of migraine. I have discussed with Ms Toby Frances different options such as stop the med, referring to cardiologist how ever she would like to discuss this with her supervisor and then let me know. Showed my concern about low BP. Spent > 40 minutes counseling, filling up forms. Follow-up and Dispositions    · Return in about 2 months (around 12/13/2020) for BP, heart rate. .       reviewed diet, exercise and weight control  reviewed medications and side effects in detail.

## 2020-10-14 ENCOUNTER — TELEPHONE (OUTPATIENT)
Dept: PRIMARY CARE CLINIC | Age: 40
End: 2020-10-14

## 2020-10-14 LAB
25(OH)D3+25(OH)D2 SERPL-MCNC: 24 NG/ML (ref 30–100)
ALBUMIN SERPL-MCNC: 4.8 G/DL (ref 4–5)
ALBUMIN/GLOB SERPL: 2 {RATIO} (ref 1.2–2.2)
ALP SERPL-CCNC: 81 IU/L (ref 39–117)
ALT SERPL-CCNC: 15 IU/L (ref 0–44)
AST SERPL-CCNC: 19 IU/L (ref 0–40)
BASOPHILS # BLD AUTO: 0.1 X10E3/UL (ref 0–0.2)
BASOPHILS NFR BLD AUTO: 1 %
BILIRUB SERPL-MCNC: 0.3 MG/DL (ref 0–1.2)
BUN SERPL-MCNC: 15 MG/DL (ref 6–20)
BUN/CREAT SERPL: 21 (ref 9–20)
CALCIUM SERPL-MCNC: 10.5 MG/DL (ref 8.7–10.2)
CHLORIDE SERPL-SCNC: 104 MMOL/L (ref 96–106)
CHOLEST SERPL-MCNC: 145 MG/DL (ref 100–199)
CO2 SERPL-SCNC: 22 MMOL/L (ref 20–29)
CREAT SERPL-MCNC: 0.72 MG/DL (ref 0.76–1.27)
EOSINOPHIL # BLD AUTO: 0.2 X10E3/UL (ref 0–0.4)
EOSINOPHIL NFR BLD AUTO: 3 %
ERYTHROCYTE [DISTWIDTH] IN BLOOD BY AUTOMATED COUNT: 13.2 % (ref 11.6–15.4)
GLOBULIN SER CALC-MCNC: 2.4 G/DL (ref 1.5–4.5)
GLUCOSE SERPL-MCNC: 89 MG/DL (ref 65–99)
HCT VFR BLD AUTO: 49.6 % (ref 37.5–51)
HDLC SERPL-MCNC: 53 MG/DL
HGB BLD-MCNC: 16.7 G/DL (ref 13–17.7)
IMM GRANULOCYTES # BLD AUTO: 0 X10E3/UL (ref 0–0.1)
IMM GRANULOCYTES NFR BLD AUTO: 0 %
LDLC SERPL CALC-MCNC: 71 MG/DL (ref 0–99)
LYMPHOCYTES # BLD AUTO: 2 X10E3/UL (ref 0.7–3.1)
LYMPHOCYTES NFR BLD AUTO: 22 %
MCH RBC QN AUTO: 30 PG (ref 26.6–33)
MCHC RBC AUTO-ENTMCNC: 33.7 G/DL (ref 31.5–35.7)
MCV RBC AUTO: 89 FL (ref 79–97)
MONOCYTES # BLD AUTO: 0.6 X10E3/UL (ref 0.1–0.9)
MONOCYTES NFR BLD AUTO: 6 %
NEUTROPHILS # BLD AUTO: 6.1 X10E3/UL (ref 1.4–7)
NEUTROPHILS NFR BLD AUTO: 68 %
PLATELET # BLD AUTO: 238 X10E3/UL (ref 150–450)
POTASSIUM SERPL-SCNC: 5.1 MMOL/L (ref 3.5–5.2)
PROT SERPL-MCNC: 7.2 G/DL (ref 6–8.5)
RBC # BLD AUTO: 5.57 X10E6/UL (ref 4.14–5.8)
SODIUM SERPL-SCNC: 140 MMOL/L (ref 134–144)
TRIGL SERPL-MCNC: 119 MG/DL (ref 0–149)
TSH SERPL DL<=0.005 MIU/L-ACNC: 0.71 UIU/ML (ref 0.45–4.5)
VLDLC SERPL CALC-MCNC: 21 MG/DL (ref 5–40)
WBC # BLD AUTO: 9 X10E3/UL (ref 3.4–10.8)

## 2020-10-14 NOTE — TELEPHONE ENCOUNTER
Waiting on a bowel movement protocol or physical form that was done at his last visit    It was supposed to be emailed/ faxed and she never received it

## 2020-10-17 LAB
GAMMA INTERFERON BACKGROUND BLD IA-ACNC: 0.02 IU/ML
M TB IFN-G BLD-IMP: NEGATIVE
M TB IFN-G CD4+ BCKGRND COR BLD-ACNC: 0.02 IU/ML
MITOGEN IGNF BLD-ACNC: >10 IU/ML
QUANTIFERON TB2 AG: 0.02 IU/ML
SERVICE CMNT-IMP: NORMAL

## 2020-10-21 DIAGNOSIS — E55.9 VITAMIN D DEFICIENCY: Primary | ICD-10-CM

## 2020-10-21 RX ORDER — CHOLECALCIFEROL (VITAMIN D3) 125 MCG
2000 CAPSULE ORAL DAILY
Qty: 90 TAB | Refills: 1 | Status: SHIPPED | OUTPATIENT
Start: 2020-10-21 | End: 2022-07-14 | Stop reason: SDUPTHER

## 2020-10-21 NOTE — PROGRESS NOTES
Please call patient and mail letter:    1. Vit D level is still low. Make sure to take Vit D supplement 2,000 iu daily. Refill sent to pharmacy. 2. Blood TB test came back negative. 3. Calcium level is slightly above normal range. Recommending to stop taking any calcium supplement if you are taking any. 4. Thyroid, kidney liver function is normal.    Follow up in 6 months.

## 2020-10-21 NOTE — PROGRESS NOTES
Called and spoke with the supervisor and gave fang the results over the phone, she inquired about the forms and they were faxed to the group home.  I will confirm with Dr. Lj Costa

## 2020-11-09 ENCOUNTER — TELEPHONE (OUTPATIENT)
Dept: PRIMARY CARE CLINIC | Age: 40
End: 2020-11-09

## 2020-11-11 NOTE — TELEPHONE ENCOUNTER
Spoke with Pricila Valdez, fax number is [phone]. She also states we have to use their form to document Bowel Protocol and will be faxing it to us so you can complete.

## 2021-03-12 DIAGNOSIS — R00.2 PALPITATION: ICD-10-CM

## 2021-03-15 RX ORDER — PROPRANOLOL HYDROCHLORIDE 20 MG/1
TABLET ORAL
Qty: 31 TAB | Refills: 1 | Status: SHIPPED | OUTPATIENT
Start: 2021-03-15 | End: 2021-06-27

## 2021-03-15 RX ORDER — DOCUSATE SODIUM 100 MG/1
CAPSULE, LIQUID FILLED ORAL
Qty: 31 CAP | Refills: 5 | Status: SHIPPED | OUTPATIENT
Start: 2021-03-15 | End: 2021-06-27

## 2021-06-21 ENCOUNTER — VIRTUAL VISIT (OUTPATIENT)
Dept: PRIMARY CARE CLINIC | Age: 41
End: 2021-06-21
Payer: MEDICAID

## 2021-06-21 DIAGNOSIS — H66.002 NON-RECURRENT ACUTE SUPPURATIVE OTITIS MEDIA OF LEFT EAR WITHOUT SPONTANEOUS RUPTURE OF TYMPANIC MEMBRANE: Primary | ICD-10-CM

## 2021-06-21 DIAGNOSIS — H91.93 DECREASED HEARING, BILATERAL: ICD-10-CM

## 2021-06-21 PROCEDURE — 99213 OFFICE O/P EST LOW 20 MIN: CPT | Performed by: NURSE PRACTITIONER

## 2021-06-21 RX ORDER — AZITHROMYCIN 500 MG/1
500 TABLET, FILM COATED ORAL DAILY
Qty: 3 TABLET | Refills: 0 | Status: SHIPPED | OUTPATIENT
Start: 2021-06-21 | End: 2021-06-24

## 2021-06-21 NOTE — PROGRESS NOTES
Ezra Gomez (: 1980) is a 47 y.o. male, established patient, here for evaluation of the following chief complaint(s):   Ear Pain (patient has ear ache in left ear, started a couple of days ago. Been taking tylenol, it drained last night)       ASSESSMENT/PLAN:  Below is the assessment and plan developed based on review of pertinent labs, studies, and medications. 1. Non-recurrent acute suppurative otitis media of left ear without spontaneous rupture of tympanic membrane  -     azithromycin (ZITHROMAX) 500 mg tab; Take 1 Tablet by mouth daily for 3 days. , Normal, Disp-3 Tablet, R-0  2. Decreased hearing, bilateral  Debrox ear wax removal drops BL X 4 days after abx completed. -     Return if symptoms worsen or fail to improve. SUBJECTIVE/OBJECTIVE:  HPI    Ear ache X 3 days left ear. Endorses pressure and yellow drainage. Wears hearing aids BL. Decreased hearing in left ear with hearing aid in place. Supervisor Lunsford torrey on the call with Chava Koenig. Review of Systems   Constitutional: Negative for fever. HENT: Positive for ear pain (left ear ). Negative for sore throat. Neurological: Positive for light-headedness. No flowsheet data found. Physical Exam  Constitutional:       General: He is not in acute distress. Appearance: Normal appearance. HENT:      Head: Normocephalic and atraumatic. Right Ear: External ear normal. Decreased hearing noted. Left Ear: External ear normal. Decreased hearing noted. Eyes:      Conjunctiva/sclera: Conjunctivae normal.   Neck:      Trachea: Phonation normal.   Pulmonary:      Effort: Pulmonary effort is normal. No respiratory distress. Neurological:      Mental Status: He is alert. Psychiatric:         Attention and Perception: Attention normal.           Ezra Gomez, was evaluated through a synchronous (real-time) audio-video encounter. The patient (or guardian if applicable) is aware that this is a billable service.  Verbal consent to proceed has been obtained within the past 12 months. The visit was conducted pursuant to the emergency declaration under the [address] [place] [address] and the [employer] Act. Patient identification was verified, and a caregiver was present when appropriate. The patient was located in a state where the provider was credentialed to provide care. An electronic signature was used to authenticate this note.   -- Jaskaran Jose NP

## 2021-10-18 ENCOUNTER — OFFICE VISIT (OUTPATIENT)
Dept: PRIMARY CARE CLINIC | Age: 41
End: 2021-10-18
Payer: MEDICAID

## 2021-10-18 VITALS
BODY MASS INDEX: 23.64 KG/M2 | DIASTOLIC BLOOD PRESSURE: 77 MMHG | HEIGHT: 69 IN | HEART RATE: 67 BPM | WEIGHT: 159.6 LBS | TEMPERATURE: 97.8 F | RESPIRATION RATE: 18 BRPM | OXYGEN SATURATION: 97 % | SYSTOLIC BLOOD PRESSURE: 114 MMHG

## 2021-10-18 DIAGNOSIS — F25.9 SCHIZOAFFECTIVE DISORDER, UNSPECIFIED TYPE (HCC): ICD-10-CM

## 2021-10-18 DIAGNOSIS — Z11.1 SCREENING-PULMONARY TB: ICD-10-CM

## 2021-10-18 DIAGNOSIS — L70.9 ACNE, UNSPECIFIED ACNE TYPE: ICD-10-CM

## 2021-10-18 DIAGNOSIS — E03.9 ACQUIRED HYPOTHYROIDISM: ICD-10-CM

## 2021-10-18 DIAGNOSIS — F70 MILD INTELLECTUAL DISABILITY: ICD-10-CM

## 2021-10-18 DIAGNOSIS — E55.9 VITAMIN D DEFICIENCY: ICD-10-CM

## 2021-10-18 DIAGNOSIS — Z23 NEEDS FLU SHOT: ICD-10-CM

## 2021-10-18 DIAGNOSIS — Z00.00 WELL ADULT ON ROUTINE HEALTH CHECK: Primary | ICD-10-CM

## 2021-10-18 PROCEDURE — 90471 IMMUNIZATION ADMIN: CPT | Performed by: FAMILY MEDICINE

## 2021-10-18 PROCEDURE — 90686 IIV4 VACC NO PRSV 0.5 ML IM: CPT | Performed by: FAMILY MEDICINE

## 2021-10-18 PROCEDURE — 99395 PREV VISIT EST AGE 18-39: CPT | Performed by: FAMILY MEDICINE

## 2021-10-18 PROCEDURE — 99212 OFFICE O/P EST SF 10 MIN: CPT | Performed by: FAMILY MEDICINE

## 2021-10-18 RX ORDER — LEVOTHYROXINE SODIUM 100 UG/1
TABLET ORAL
Qty: 30 TABLET | Refills: 0 | Status: SHIPPED | OUTPATIENT
Start: 2021-10-18 | End: 2021-11-22

## 2021-10-18 NOTE — PROGRESS NOTES
Chief Complaint   Patient presents with    Physical       Health Maintenance Due   Topic    Pneumococcal 0-64 years (1 of 2 - PPSV23)    COVID-19 Vaccine (1)        1. Have you been to the ER, urgent care clinic since your last visit? Hospitalized since your last visit? No    2. Have you seen or consulted any other health care providers outside of the [address] [place] [address] since your last visit? Include any pap smears or colon screening.  No     Visit Vitals  /77 (BP 1 Location: Left upper arm, BP Patient Position: Sitting, BP Cuff Size: Adult)   Pulse 67   Temp 97.8 °F (36.6 °C) (Temporal)   Resp 18   Ht 5' 9\" (1.753 m)   Wt 159 lb 9.6 oz (72.4 kg)   SpO2 97%   BMI 23.57 kg/m²

## 2021-10-18 NOTE — PROGRESS NOTES
Subjective:   Cristian Cleveland is a 46 y.o. male presenting for his annual checkup. He is here with his group home staff member Anastasia Massey from [facility]. His  medical HX is significant for schizoaffective disorder, depressive disorder, hypothyroidism, nystagmus, BL hearing loss w/ BL hearing aids, asthma, mild ID. He has been following up with psychiatrist regularly.      Hypothyroid:  He is currently on synthroid 100 mcg daily.      Acne: he has stopped taking acne med.      Patient is on Propranolol 20 mg. Initially when I saw him he was already on Propranolol. Ms. Maciej Guzmán does not know why he is on Propranolol. I have contacted the group home several times to find out why he is on propranolol but no clear answer. I continued to provide the refill to avoid any withdrawal.      He has been following up with urologist regularly and taking Flomax 0.4 mg. Has been following up with psychiatrist.      Currently On Vit D 2,000 iu daily.      Need  TB screen.           There was a [employer] physical form was attached with his paperwork but she says she does not need the form filled out. ROS:  Feeling well. No dyspnea or chest pain on exertion. No abdominal pain, change in bowel habits, black or bloody stools. No urinary tract or prostatic symptoms. No neurological complaints.     Specific concerns today: none    Patient Active Problem List   Diagnosis Code    Mild intellectual disability F70    Schizoaffective disorder (HCC) F25.9    Acute left-sided low back pain without sciatica M54.50    Acquired hypothyroidism E03.9    Acne L70.9    Nystagmus H55.00    Depressive disorder F32.9     Current Outpatient Medications   Medication Sig Dispense Refill    Vitamin D3 50 mcg (2,000 unit) cap capsule TAKE 1 SOFTGEL BY MOUTH EVERY DAY 30 Capsule 0    propranoloL (INDERAL) 20 mg tablet 1 TABLET BY MOUTH EVERY DAY 30 Tablet 0    levothyroxine (SYNTHROID) 100 mcg tablet 1 TABLET BY MOUTH EVERY DAY 30 Tablet 0    acetaminophen (TYLENOL) 500 mg tablet TAKE 1 TABLET BY MOUTH EVERY 6 HOURS AS NEEDED FOR PAIN OR FEVER 30 Tablet 2    cholecalciferol, vitamin D3, 50 mcg (2,000 unit) tab Take 1 Tab by mouth daily. 90 Tab 1    fluticasone propionate (FLONASE) 50 mcg/actuation nasal spray BLOW NOSE: THEN USE 2 SPRAYS IN EACH NOSTRIL TWICE DAILY AS NEEDED FOR CONGESTION 16 g 11    tamsulosin (FLOMAX) 0.4 mg capsule Take 0.4 mg by mouth daily.  gabapentin (NEURONTIN) 400 mg capsule Take 800 mg by mouth two (2) times a day.  lithium carbonate (LITHOBID) 150 mg capsule Take 450 mg by mouth every morning.  lithium carbonate (LITHONATE) 300 mg capsule Take 600 mg by mouth nightly. morning       OLANZapine (ZYPREXA) 15 mg tablet Take 30 mg by mouth nightly.  QUEtiapine (SEROQUEL) 400 mg tablet Take 400 mg by mouth two (2) times a day.  sertraline (ZOLOFT) 50 mg tablet Take 50 mg by mouth nightly.   mg capsule 1 CAPSULE BY MOUTH EVERY DAY 30 Capsule 4    sunscreen 15 SPF lotion Use it as needed. (Patient not taking: Reported on 10/18/2021) 1 Bottle 1    clindamycin (CLINDAGEL) 1 % topical gel Apply  to affected area two (2) times a day. use thin film on affected area (Patient not taking: Reported on 10/18/2021) 1 mL 3    ciprofloxacin-dexamethasone (CIPRODEX) 0.3-0.1 % otic suspension Administer 4 Drops in left ear two (2) times a day. (Patient not taking: Reported on 10/18/2021)      albuterol sulfate 90 mcg/actuation aepb Take  by inhalation as needed. (Patient not taking: Reported on 10/18/2021)      ibuprofen (MOTRIN) 600 mg tablet Take 1 Tab by mouth every eight (8) hours as needed for Pain.  (Patient not taking: Reported on 10/18/2021) 30 Tab 0     No Known Allergies  Past Medical History:   Diagnosis Date    Depressive disorder     Hypothyroid     Schizoaffective disorder (HCC)     Uses hearing aid         Lab Results   Component Value Date/Time    WBC 9.0 10/13/2020 12:00 AM    HGB 16.7 10/13/2020 12:00 AM    HCT 49.6 10/13/2020 12:00 AM    PLATELET 728 02/56/4663 12:00 AM    MCV 89 10/13/2020 12:00 AM     Lab Results   Component Value Date/Time    Cholesterol, total 145 10/13/2020 12:00 AM    HDL Cholesterol 53 10/13/2020 12:00 AM    LDL, calculated 71 10/13/2020 12:00 AM    LDL, calculated 79 05/06/2019 11:25 AM    Triglyceride 119 10/13/2020 12:00 AM     Lab Results   Component Value Date/Time    ALT (SGPT) 15 10/13/2020 12:00 AM    Alk. phosphatase 81 10/13/2020 12:00 AM    Bilirubin, total 0.3 10/13/2020 12:00 AM    Albumin 4.8 10/13/2020 12:00 AM    Protein, total 7.2 10/13/2020 12:00 AM    PLATELET 660 96/00/4550 12:00 AM     Lab Results   Component Value Date/Time    TSH 0.708 10/13/2020 12:00 AM    TSH 0.482 11/13/2019 01:51 PM    T4, Free 1.13 11/13/2019 01:51 PM      No results found for: HBA1C, KSV0MEZM, BKK8FSBJ, ILQ7RWCV      Objective:     Visit Vitals  /77 (BP 1 Location: Left upper arm, BP Patient Position: Sitting, BP Cuff Size: Adult)   Pulse 67   Temp 97.8 °F (36.6 °C) (Temporal)   Resp 18   Ht 5' 9\" (1.753 m)   Wt 159 lb 9.6 oz (72.4 kg)   SpO2 97%   BMI 23.57 kg/m²     The patient appears well, alert, oriented x 3, in no distress. Eye: chronic nystagmus. ENT normal.  Neck supple. No adenopathy or thyromegaly. AVILA. Lungs are clear, good air entry, no wheezes, rhonchi or rales. S1 and S2 normal, no murmurs, regular rate and rhythm. Abdomen is soft without tenderness, guarding, mass or organomegaly.  exam: defererd. Extremities show no edema, normal peripheral pulses. Neurological is normal without focal findings. Skin: mild acne noted. Assessment/Plan:   healthy adult male  follow low fat diet, follow low salt diet, continue present plan, routine labs ordered, call if any problems. ICD-10-CM ICD-9-CM    1.  Well adult on routine health check  J31.29 Q82.2 METABOLIC PANEL, COMPREHENSIVE      LIPID PANEL      HEMOGLOBIN A1C WITH EAG CBC WITH AUTOMATED DIFF      METABOLIC PANEL, COMPREHENSIVE      LIPID PANEL      HEMOGLOBIN A1C WITH EAG      CBC WITH AUTOMATED DIFF   2. Needs flu shot  Z23 V04.81 INFLUENZA VIRUS VAC QUAD,SPLIT,PRESV FREE SYRINGE IM   3. Acquired hypothyroidism  E03.9 244.9 THYROID CASCADE PROFILE      THYROID CASCADE PROFILE   4. Mild intellectual disability  F70 317    5. Vitamin D deficiency  E55.9 268.9 VITAMIN D, 25 HYDROXY      VITAMIN D, 25 HYDROXY   6. Screening-pulmonary TB  Z11.1 V74.1 QUANTIFERON-TB PLUS(CLIENT INCUB.)      QUANTIFERON-TB PLUS(CLIENT INCUB.)   7. Schizoaffective disorder, unspecified type ([facility]  F25.9 295.70    8. Acne, unspecified acne type  L70.9 706.1      Diagnoses and all orders for this visit:    1. Well adult on routine health check  -     METABOLIC PANEL, COMPREHENSIVE; Future  -     LIPID PANEL; Future  -     HEMOGLOBIN A1C WITH EAG; Future  -     CBC WITH AUTOMATED DIFF; Future    2. Needs flu shot  -     INFLUENZA VIRUS VAC QUAD,SPLIT,PRESV FREE SYRINGE IM    3. Acquired hypothyroidism  -     THYROID CASCADE PROFILE; Future  -     levothyroxine (SYNTHROID) 100 mcg tablet; 1 TABLET BY MOUTH EVERY DAY    4. Mild intellectual disability    5. Vitamin D deficiency  -     VITAMIN D, 25 HYDROXY; Future    6. Screening-pulmonary TB  -     QUANTIFERON-TB PLUS(CLIENT INCUB.); Future    7. Schizoaffective disorder, unspecified type ([facility]      Managed by psychiatrist.  8. Acne, unspecified acne type      Stable without any med. Follow-up and Dispositions    · Return in about 6 months (around 4/18/2022), or if symptoms worsen or fail to improve, for thyroid. current treatment plan is effective, no change in therapy  reviewed diet, exercise and weight control.

## 2021-10-19 LAB
25(OH)D3+25(OH)D2 SERPL-MCNC: 41.2 NG/ML (ref 30–100)
ALBUMIN SERPL-MCNC: 5.1 G/DL (ref 4–5)
ALBUMIN/GLOB SERPL: 2.4 {RATIO} (ref 1.2–2.2)
ALP SERPL-CCNC: 79 IU/L (ref 44–121)
ALT SERPL-CCNC: 13 IU/L (ref 0–44)
AST SERPL-CCNC: 17 IU/L (ref 0–40)
BASOPHILS # BLD AUTO: 0.1 X10E3/UL (ref 0–0.2)
BASOPHILS NFR BLD AUTO: 1 %
BILIRUB SERPL-MCNC: 0.4 MG/DL (ref 0–1.2)
BUN SERPL-MCNC: 14 MG/DL (ref 6–20)
BUN/CREAT SERPL: 18 (ref 9–20)
CALCIUM SERPL-MCNC: 10 MG/DL (ref 8.7–10.2)
CHLORIDE SERPL-SCNC: 102 MMOL/L (ref 96–106)
CHOLEST SERPL-MCNC: 155 MG/DL (ref 100–199)
CO2 SERPL-SCNC: 25 MMOL/L (ref 20–29)
CREAT SERPL-MCNC: 0.79 MG/DL (ref 0.76–1.27)
EOSINOPHIL # BLD AUTO: 0.2 X10E3/UL (ref 0–0.4)
EOSINOPHIL NFR BLD AUTO: 1 %
ERYTHROCYTE [DISTWIDTH] IN BLOOD BY AUTOMATED COUNT: 13 % (ref 11.6–15.4)
EST. AVERAGE GLUCOSE BLD GHB EST-MCNC: 103 MG/DL
GLOBULIN SER CALC-MCNC: 2.1 G/DL (ref 1.5–4.5)
GLUCOSE SERPL-MCNC: 80 MG/DL (ref 65–99)
HBA1C MFR BLD: 5.2 % (ref 4.8–5.6)
HCT VFR BLD AUTO: 48.1 % (ref 37.5–51)
HDLC SERPL-MCNC: 55 MG/DL
HGB BLD-MCNC: 16.2 G/DL (ref 13–17.7)
IMM GRANULOCYTES # BLD AUTO: 0 X10E3/UL (ref 0–0.1)
IMM GRANULOCYTES NFR BLD AUTO: 0 %
LDLC SERPL CALC-MCNC: 86 MG/DL (ref 0–99)
LYMPHOCYTES # BLD AUTO: 1.5 X10E3/UL (ref 0.7–3.1)
LYMPHOCYTES NFR BLD AUTO: 12 %
MCH RBC QN AUTO: 30.4 PG (ref 26.6–33)
MCHC RBC AUTO-ENTMCNC: 33.7 G/DL (ref 31.5–35.7)
MCV RBC AUTO: 90 FL (ref 79–97)
MONOCYTES # BLD AUTO: 0.7 X10E3/UL (ref 0.1–0.9)
MONOCYTES NFR BLD AUTO: 6 %
NEUTROPHILS # BLD AUTO: 9.3 X10E3/UL (ref 1.4–7)
NEUTROPHILS NFR BLD AUTO: 80 %
PLATELET # BLD AUTO: 246 X10E3/UL (ref 150–450)
POTASSIUM SERPL-SCNC: 4.8 MMOL/L (ref 3.5–5.2)
PROT SERPL-MCNC: 7.2 G/DL (ref 6–8.5)
RBC # BLD AUTO: 5.33 X10E6/UL (ref 4.14–5.8)
SODIUM SERPL-SCNC: 139 MMOL/L (ref 134–144)
TRIGL SERPL-MCNC: 73 MG/DL (ref 0–149)
TSH SERPL DL<=0.005 MIU/L-ACNC: 1.9 UIU/ML (ref 0.45–4.5)
VLDLC SERPL CALC-MCNC: 14 MG/DL (ref 5–40)
WBC # BLD AUTO: 11.8 X10E3/UL (ref 3.4–10.8)

## 2021-10-20 NOTE — PROGRESS NOTES
Please mail letter:    WBC count is slightly elevated from normal range but not concerning. Please watch for any signs of infection and follow up if needed otherwise Vit d level, kidney, liver, thyroid, cholesterol, blood sugar level is fine.

## 2021-10-21 LAB
GAMMA INTERFERON BACKGROUND BLD IA-ACNC: 0.01 IU/ML
M TB IFN-G BLD-IMP: NEGATIVE
M TB IFN-G CD4+ BCKGRND COR BLD-ACNC: 0.02 IU/ML
MITOGEN IGNF BLD-ACNC: >10 IU/ML
QUANTIFERON TB2 AG: 0.01 IU/ML
SERVICE CMNT-IMP: NORMAL

## 2021-12-23 DIAGNOSIS — R00.2 PALPITATION: ICD-10-CM

## 2021-12-27 RX ORDER — DOCUSATE SODIUM 100 MG/1
CAPSULE, LIQUID FILLED ORAL
Qty: 31 CAPSULE | Refills: 0 | Status: SHIPPED | OUTPATIENT
Start: 2021-12-27 | End: 2022-02-28

## 2021-12-27 RX ORDER — PROPRANOLOL HYDROCHLORIDE 20 MG/1
TABLET ORAL
Qty: 31 TABLET | Refills: 0 | Status: SHIPPED | OUTPATIENT
Start: 2021-12-27 | End: 2022-02-28

## 2022-03-18 PROBLEM — F70 MILD INTELLECTUAL DISABILITY: Status: ACTIVE | Noted: 2019-02-19

## 2022-03-19 PROBLEM — M54.50 ACUTE LEFT-SIDED LOW BACK PAIN WITHOUT SCIATICA: Status: ACTIVE | Noted: 2019-02-19

## 2022-03-19 PROBLEM — E03.9 ACQUIRED HYPOTHYROIDISM: Status: ACTIVE | Noted: 2019-02-19

## 2022-03-19 PROBLEM — L70.9 ACNE: Status: ACTIVE | Noted: 2019-02-19

## 2022-03-20 PROBLEM — H55.00 NYSTAGMUS: Status: ACTIVE | Noted: 2019-02-19

## 2022-03-20 PROBLEM — F25.9 SCHIZOAFFECTIVE DISORDER (HCC): Status: ACTIVE | Noted: 2019-02-19

## 2022-07-14 ENCOUNTER — OFFICE VISIT (OUTPATIENT)
Dept: PRIMARY CARE CLINIC | Age: 42
End: 2022-07-14
Payer: MEDICAID

## 2022-07-14 VITALS
HEART RATE: 56 BPM | WEIGHT: 148 LBS | BODY MASS INDEX: 21.92 KG/M2 | HEIGHT: 69 IN | RESPIRATION RATE: 12 BRPM | SYSTOLIC BLOOD PRESSURE: 103 MMHG | OXYGEN SATURATION: 95 % | DIASTOLIC BLOOD PRESSURE: 66 MMHG | TEMPERATURE: 97.1 F

## 2022-07-14 DIAGNOSIS — Z71.6 ENCOUNTER FOR SMOKING CESSATION COUNSELING: ICD-10-CM

## 2022-07-14 DIAGNOSIS — H91.93 BILATERAL HEARING LOSS, UNSPECIFIED HEARING LOSS TYPE: ICD-10-CM

## 2022-07-14 DIAGNOSIS — N40.0 BENIGN PROSTATIC HYPERPLASIA WITHOUT LOWER URINARY TRACT SYMPTOMS: ICD-10-CM

## 2022-07-14 DIAGNOSIS — F70 MILD INTELLECTUAL DISABILITY: ICD-10-CM

## 2022-07-14 DIAGNOSIS — E55.9 VITAMIN D DEFICIENCY DISEASE: ICD-10-CM

## 2022-07-14 DIAGNOSIS — F33.41 RECURRENT MAJOR DEPRESSIVE DISORDER, IN PARTIAL REMISSION (HCC): ICD-10-CM

## 2022-07-14 DIAGNOSIS — M54.50 CHRONIC BILATERAL LOW BACK PAIN WITHOUT SCIATICA: ICD-10-CM

## 2022-07-14 DIAGNOSIS — J30.89 ENVIRONMENTAL AND SEASONAL ALLERGIES: ICD-10-CM

## 2022-07-14 DIAGNOSIS — L70.9 ACNE, UNSPECIFIED ACNE TYPE: ICD-10-CM

## 2022-07-14 DIAGNOSIS — F25.9 SCHIZOAFFECTIVE DISORDER, UNSPECIFIED TYPE (HCC): ICD-10-CM

## 2022-07-14 DIAGNOSIS — H55.00 NYSTAGMUS: ICD-10-CM

## 2022-07-14 DIAGNOSIS — F17.210 CIGARETTE SMOKER: ICD-10-CM

## 2022-07-14 DIAGNOSIS — Z23 ENCOUNTER FOR IMMUNIZATION: ICD-10-CM

## 2022-07-14 DIAGNOSIS — G89.29 CHRONIC BILATERAL LOW BACK PAIN WITHOUT SCIATICA: ICD-10-CM

## 2022-07-14 DIAGNOSIS — R00.1 SINUS BRADYCARDIA BY ELECTROCARDIOGRAM: ICD-10-CM

## 2022-07-14 DIAGNOSIS — Z79.899 MEDICATION MANAGEMENT: ICD-10-CM

## 2022-07-14 DIAGNOSIS — K59.09 CHRONIC CONSTIPATION: ICD-10-CM

## 2022-07-14 DIAGNOSIS — E03.9 ACQUIRED HYPOTHYROIDISM: ICD-10-CM

## 2022-07-14 DIAGNOSIS — J45.40 MODERATE PERSISTENT ASTHMA WITHOUT COMPLICATION: Primary | ICD-10-CM

## 2022-07-14 PROCEDURE — 93000 ELECTROCARDIOGRAM COMPLETE: CPT | Performed by: NURSE PRACTITIONER

## 2022-07-14 PROCEDURE — 99214 OFFICE O/P EST MOD 30 MIN: CPT | Performed by: NURSE PRACTITIONER

## 2022-07-14 RX ORDER — NICOTINE 7MG/24HR
1 PATCH, TRANSDERMAL 24 HOURS TRANSDERMAL EVERY 24 HOURS
Qty: 56 PATCH | Refills: 0 | Status: SHIPPED | OUTPATIENT
Start: 2022-07-14 | End: 2022-08-02 | Stop reason: SDUPTHER

## 2022-07-14 RX ORDER — FLUTICASONE PROPIONATE 50 MCG
SPRAY, SUSPENSION (ML) NASAL
Qty: 16 G | Refills: 2 | Status: SHIPPED | OUTPATIENT
Start: 2022-07-14

## 2022-07-14 RX ORDER — TAMSULOSIN HYDROCHLORIDE 0.4 MG/1
0.4 CAPSULE ORAL DAILY
Status: CANCELLED | OUTPATIENT
Start: 2022-07-14

## 2022-07-14 RX ORDER — LEVOTHYROXINE SODIUM 100 UG/1
100 TABLET ORAL
Qty: 30 TABLET | Refills: 0 | Status: CANCELLED | OUTPATIENT
Start: 2022-07-14

## 2022-07-14 RX ORDER — CHOLECALCIFEROL (VITAMIN D3) 125 MCG
2000 CAPSULE ORAL DAILY
Qty: 90 TABLET | Refills: 3 | Status: SHIPPED | OUTPATIENT
Start: 2022-07-14

## 2022-07-14 RX ORDER — DOCUSATE SODIUM 100 MG/1
CAPSULE, LIQUID FILLED ORAL
Qty: 90 CAPSULE | Refills: 3 | Status: SHIPPED | OUTPATIENT
Start: 2022-07-14

## 2022-07-14 RX ORDER — FLUTICASONE PROPIONATE AND SALMETEROL 250; 50 UG/1; UG/1
1 POWDER RESPIRATORY (INHALATION) EVERY 12 HOURS
Qty: 180 EACH | Refills: 0 | Status: SHIPPED | OUTPATIENT
Start: 2022-07-14 | End: 2022-09-06 | Stop reason: SDUPTHER

## 2022-07-14 RX ORDER — LEVOTHYROXINE SODIUM 100 UG/1
100 TABLET ORAL
Qty: 30 TABLET | Refills: 0 | Status: SHIPPED | OUTPATIENT
Start: 2022-07-14 | End: 2022-08-02 | Stop reason: SDUPTHER

## 2022-07-14 RX ORDER — MONTELUKAST SODIUM 10 MG/1
10 TABLET ORAL DAILY
Qty: 90 TABLET | Refills: 0 | Status: SHIPPED | OUTPATIENT
Start: 2022-07-14 | End: 2022-08-02 | Stop reason: SDUPTHER

## 2022-07-14 RX ORDER — PROPRANOLOL HYDROCHLORIDE 20 MG/1
TABLET ORAL
Qty: 31 TABLET | Refills: 2 | Status: CANCELLED | OUTPATIENT
Start: 2022-07-14

## 2022-07-14 NOTE — PROGRESS NOTES
Chief Complaint   Patient presents with   [address] Maintenance Due   Topic    COVID-19 Vaccine (1)    Pneumococcal 0-64 years (1 - PCV)    Depression Monitoring         1. Have you been to the ER, urgent care clinic since your last visit? Hospitalized since your last visit? No    2. Have you seen or consulted any other health care providers outside of the [address] [place] [address] since your last visit? Include any pap smears or colon screening.  No    Visit Vitals  BP (!) 97/53 (BP 1 Location: Left upper arm, BP Patient Position: Sitting, BP Cuff Size: Adult)   Pulse (!) 54   Temp 97.1 °F (36.2 °C) (Temporal)   Resp 12   Ht 5' 9\" (1.753 m)   Wt 148 lb (67.1 kg)   SpO2 95%   BMI 21.86 kg/m²

## 2022-07-14 NOTE — PROGRESS NOTES
[facility]   [address], [address] [facility]  P: [phone]  F: [phone]    SUBJECTIVE     HPI:     Luanne Mcconnell is a 38 y.o. male who is seen in the clinic for   Chief Complaint   Patient presents with   [facility].      The patient, who has a mild ID, presents today to establish care and for management of his co-morbidities. Hypothyroidism: Last TSH was normal. Currently on Synthroid 100 mcg once daily. Does not see Endocrinology. Asthma: per caregiver, the patient \"looks to be struggling to breath at times\". Has not been using his prn Albuterol. He is a current smoker and would like to try Nicotine patches for cessation. Schizoaffective Disorder/MDD: Managed primarily by Dr. Ratna Bates (Psychiatry). On several different medications. Denies any recent episodes. BPH: currently managed by Dr. Elpidio Khalil (Urology). On Flomax 0.4 mg once daily. Denies any current associated s/s. Nystagmus:  Denies any complications from this. Does not see Opthalmology or Neurology for management. Vitamin D Def: Last Vitamin D level was 41.2. Currently on supplementation. He takes Gabapentin 800 mg BID for management of his chronic lower back pain. Denies any associated s/s today. Acne: Well managed with prn Clindamycin gel. Has Bilateral Hearing Loss due to unknown etiology. Uses hearing aids that are ordered by an Audiologist.     Allergies: complains of intermittent sinus congestion w/ rhinorrhea. Has not been taking his Flonase lately. He takes Docusate Sodium daily for his chronic constipation. Endorses a daily BM. Initial vital signs shows a HR of 54 and BP 97/53. Second set of vital signs show HR was 56 and BP was 103/66. EKG shows Sinus Bradycardia. Denies any associated cardiac s/s. He is currently on Propanolol for \"unknown reasons\". [facility] and is accompanied by a caregiver, who speaks on his behalf.      Patient Active Problem List    Diagnosis    Depressive disorder    Mild intellectual disability    Schizoaffective disorder ([facility]    Acute left-sided low back pain without sciatica    Acquired hypothyroidism    Acne    Nystagmus        Past Medical History:   Diagnosis Date    Depressive disorder     Hypothyroid     Schizoaffective disorder ([facility]     Uses hearing aid      History reviewed. No pertinent surgical history. Social History     Socioeconomic History    Marital status: SINGLE     Spouse name: Not on file    Number of children: Not on file    Years of education: Not on file    Highest education level: Not on file   Occupational History    Not on file   Tobacco Use    Smoking status: Current Every Day Smoker     Packs/day: 0.25    Smokeless tobacco: Current User   Vaping Use    Vaping Use: Never used   Substance and Sexual Activity    Alcohol use: No    Drug use: No    Sexual activity: Never   Other Topics Concern    Not on file   Social History Narrative    Not on file     Social Determinants of Health     Financial Resource Strain:     Difficulty of Paying Living Expenses: Not on file   Food Insecurity:     Worried About Running Out of Food in the Last Year: Not on file    Behzad of Food in the Last Year: Not on file   Transportation Needs:     Lack of Transportation (Medical): Not on file    Lack of Transportation (Non-Medical):  Not on file   Physical Activity:     Days of Exercise per Week: Not on file    Minutes of Exercise per Session: Not on file   Stress:     Feeling of Stress : Not on file   Social Connections:     Frequency of Communication with Friends and Family: Not on file    Frequency of Social Gatherings with Friends and Family: Not on file    Attends Rastafari Services: Not on file    Active Member of Clubs or Organizations: Not on file    Attends Club or Organization Meetings: Not on file    Marital Status: Not on file   Intimate Partner Violence:     Fear of Current or Ex-Partner: Not on file    Emotionally Abused: Not on file    Physically Abused: Not on file    Sexually Abused: Not on file   Housing Stability:     Unable to Pay for Housing in the Last Year: Not on file    Number of Places Lived in the Last Year: Not on file    Unstable Housing in the Last Year: Not on file     History reviewed. No pertinent family history. Immunization History   Administered Date(s) Administered    COVID-19, MODERNA BLUE border, Primary or Immunocompromised, (age 18y+), IM, 100 mcg/0.5mL 01/22/2021, 03/02/2021    COVID-19, MODERNA Booster BLUE border, (age 18y+), IM, 50mcg/0.25mL 11/18/2021    Influenza Vaccine ([address] [place] [address]) PF (>6 Mo Flulaval, Fluarix, and >3 Yrs Afluria, Fluzone [id number]) 11/13/2019, 10/13/2020, 10/18/2021    Tdap 06/08/2016      No Known Allergies    No visits with results within 3 Month(s) from this visit. Latest known visit with results is:   Office Visit on 10/18/2021   Component Date Value Ref Range Status    QuantiFERON Criteria 10/18/2021 Comment   Final    Comment: The QuantiFERON-TB Gold Plus result is determined by subtracting  the Nil value from either TB antigen (Ag) tube. The mitogen tube  serves as a control for the test.      QuantiFERON TB1 Ag 10/18/2021 0.02  IU/mL Final    QuantiFERON TB2 Ag 10/18/2021 0.01  IU/mL Final    QuantiFERON Nil Value 10/18/2021 0.01  IU/mL Final    QuantiFERON Mitogen Value 10/18/2021 >10.00  IU/mL Final    QuantiFERON Plus 10/18/2021 Negative  Negative Final    Comment: The specimen received for QuantiFERON testing was incubated by the  ordering institution. Specific procedures outlined in our Directory  of Services and in the package insert for the QuantiFERON Gold  (In Tube) test must be followed to enable for proper stimulation of  cells for the production of interferon gamma.   Chemiluminescence immunoassay methodology      Glucose 10/18/2021 80  65 - 99 mg/dL Final    BUN 10/18/2021 14  6 - 20 mg/dL Final    Creatinine 10/18/2021 0.79  0.76 - 1.27 mg/dL Final    GFR est non-AA 10/18/2021 113  >59 mL/min/1.73 Final    GFR est AA 10/18/2021 131  >59 mL/min/1.73 Final    Comment: **In accordance with recommendations from the NKF-ASN Task force,**    [facility] is in the process of updating its eGFR calculation to the    2021 CKD-EPI creatinine equation that estimates kidney function    without a race variable.  BUN/Creatinine ratio 10/18/2021 18  9 - 20 Final    Sodium 10/18/2021 139  134 - 144 mmol/L Final    Potassium 10/18/2021 4.8  3.5 - 5.2 mmol/L Final    Chloride 10/18/2021 102  96 - 106 mmol/L Final    CO2 10/18/2021 25  20 - 29 mmol/L Final    Calcium 10/18/2021 10.0  8.7 - 10.2 mg/dL Final    Protein, total 10/18/2021 7.2  6.0 - 8.5 g/dL Final    Albumin 10/18/2021 5.1* 4.0 - 5.0 g/dL Final    GLOBULIN, TOTAL 10/18/2021 2.1  1.5 - 4.5 g/dL Final    A-G Ratio 10/18/2021 2.4* 1.2 - 2.2 Final    Bilirubin, total 10/18/2021 0.4  0.0 - 1.2 mg/dL Final    Alk.  phosphatase 10/18/2021 79  44 - 121 IU/L Final                  **Please note reference interval change**    AST (SGOT) 10/18/2021 17  0 - 40 IU/L Final    ALT (SGPT) 10/18/2021 13  0 - 44 IU/L Final    Cholesterol, total 10/18/2021 155  100 - 199 mg/dL Final    Triglyceride 10/18/2021 73  0 - 149 mg/dL Final    HDL Cholesterol 10/18/2021 55  >39 mg/dL Final    VLDL, calculated 10/18/2021 14  5 - 40 mg/dL Final    LDL, calculated 10/18/2021 86  0 - 99 mg/dL Final    Hemoglobin A1c 10/18/2021 5.2  4.8 - 5.6 % Final    Comment:          Prediabetes: 5.7 - 6.4           Diabetes: >6.4           Glycemic control for adults with diabetes: <7.0      Estimated average glucose 10/18/2021 103  mg/dL Final    WBC 10/18/2021 11.8* 3.4 - 10.8 x10E3/uL Final    RBC 10/18/2021 5.33  4.14 - 5.80 x10E6/uL Final    HGB 10/18/2021 16.2  13.0 - 17.7 g/dL Final    HCT 10/18/2021 48.1  37.5 - 51.0 % Final    MCV 10/18/2021 90  79 - 97 fL Final  MCH 10/18/2021 30.4  26.6 - 33.0 pg Final    MCHC 10/18/2021 33.7  31.5 - 35.7 g/dL Final    RDW 10/18/2021 13.0  11.6 - 15.4 % Final    PLATELET 97/04/1768 928  150 - 450 x10E3/uL Final    NEUTROPHILS 10/18/2021 80  Not Estab. % Final    Lymphocytes 10/18/2021 12  Not Estab. % Final    MONOCYTES 10/18/2021 6  Not Estab. % Final    EOSINOPHILS 10/18/2021 1  Not Estab. % Final    BASOPHILS 10/18/2021 1  Not Estab. % Final    ABS. NEUTROPHILS 10/18/2021 9.3* 1.4 - 7.0 x10E3/uL Final    Abs Lymphocytes 10/18/2021 1.5  0.7 - 3.1 x10E3/uL Final    ABS. MONOCYTES 10/18/2021 0.7  0.1 - 0.9 x10E3/uL Final    ABS. EOSINOPHILS 10/18/2021 0.2  0.0 - 0.4 x10E3/uL Final    ABS. BASOPHILS 10/18/2021 0.1  0.0 - 0.2 x10E3/uL Final    IMMATURE GRANULOCYTES 10/18/2021 0  Not Estab. % Final    ABS. IMM. GRANS. 10/18/2021 0.0  0.0 - 0.1 x10E3/uL Final    TSH 10/18/2021 1.900  0.450 - 4.500 uIU/mL Final    Comment: No apparent thyroid disorder. Additional testing not indicated. In  rare instances, Secondary Hypothyroidism as well as Subclinical  Hypothyroidism have been reported in some patients with normal TSH  values.  VITAMIN D, 25-HYDROXY 10/18/2021 41.2  30.0 - 100.0 ng/mL Final    Comment: Vitamin D deficiency has been defined by the [address] practice guideline as a  level of serum 25-OH vitamin D less than 20 ng/mL (1,2). The Endocrine Society went on to further define vitamin D  insufficiency as a level between 21 and 29 ng/mL (2). 1. IOM ([place] of Medicine). 2010. Dietary reference     intakes for calcium and D. [facility]: The     [employer]. 2. Tania MF, Marin NC, Carmen KLEIN, et al.     Evaluation, treatment, and prevention of vitamin D     deficiency: an Endocrine Society clinical practice     guideline. JCEM. 2011 Jul; 96(7):1911-30.         XR CHEST PA LAT  Narrative: Exam:  2 view chest    Indication: Mild persistent asthma. COMPARISON: 12/27/2016    PA and lateral views demonstrate normal heart size. There is no acute process in  the lung fields. The overall aeration has improved. The osseous structures are  unremarkable. Impression: Impression: No acute process. No pneumonia     Current Outpatient Medications   Medication Sig Dispense Refill    fluticasone propion-salmeteroL (ADVAIR/WIXELA) 250-50 mcg/dose diskus inhaler Take 1 Puff by inhalation every twelve (12) hours for 90 days. 180 Each 0    albuterol sulfate (PROAIR RESPICLICK) 90 mcg/actuation breath activated inhaler Take 2 Puffs by inhalation every four (4) hours as needed for Wheezing or Shortness of Breath. 1 Each 1    nicotine (NICODERM CQ) 7 mg/24 hr 1 Patch by TransDERmal route every twenty-four (24) hours for 56 days. 56 Patch 0    montelukast (SINGULAIR) 10 mg tablet Take 1 Tablet by mouth daily for 90 days. 90 Tablet 0    levothyroxine (SYNTHROID) 100 mcg tablet Take 1 Tablet by mouth Daily (before breakfast). 30 Tablet 0    fluticasone propionate (FLONASE) 50 mcg/actuation nasal spray BLOW NOSE: THEN USE 2 SPRAYS IN EACH NOSTRIL 2 TIMES A DAY AS NEEDED FOR CONGESTION 16 g 2    docusate sodium (DOK) 100 mg capsule 1 CAPSULE BY MOUTH EVERY DAY 90 Capsule 3    cholecalciferol, vitamin D3, 50 mcg (2,000 unit) tab Take 1 Tablet by mouth daily. 90 Tablet 3    acetaminophen (TYLENOL) 500 mg tablet TAKE 1 TABLET BY MOUTH EVERY 6 HOURS AS NEEDED FOR PAIN OR FEVER 30 Tablet 2    sunscreen 15 SPF lotion Use it as needed. 1 Bottle 1    tamsulosin (FLOMAX) 0.4 mg capsule Take 0.4 mg by mouth daily.  gabapentin (NEURONTIN) 400 mg capsule Take 800 mg by mouth two (2) times a day.  lithium carbonate (LITHOBID) 150 mg capsule Take 450 mg by mouth every morning.  lithium carbonate (LITHONATE) 300 mg capsule Take 600 mg by mouth nightly. morning       OLANZapine (ZYPREXA) 15 mg tablet Take 30 mg by mouth nightly.       QUEtiapine (SEROQUEL) 400 mg tablet Take 400 mg by mouth two (2) times a day.  sertraline (ZOLOFT) 50 mg tablet Take 50 mg by mouth nightly.  clindamycin (CLINDAGEL) 1 % topical gel Apply  to affected area two (2) times a day. use thin film on affected area (Patient not taking: Reported on 10/18/2021) 1 mL 3           The past medical history, past surgical history, and family history were reviewed and updated in the medical record. Lab values/Imaging were reviewed. The medications were reviewed and updated in the medical record. Immunizations were reviewed and updated in the medical record. All relevant preventative screenings reviewed and updated in the medical record. REVIEW OF SYSTEMS   Review of Systems   Constitutional: Negative for chills, diaphoresis, fever and malaise/fatigue. Respiratory: Positive for shortness of breath. Negative for cough, hemoptysis, sputum production and wheezing. Cardiovascular: Negative for chest pain, palpitations, orthopnea, leg swelling and PND. Gastrointestinal: Negative for abdominal pain, constipation, nausea and vomiting. Musculoskeletal: Negative for myalgias. Neurological: Negative for dizziness, tingling, tremors, sensory change, speech change, focal weakness, weakness and headaches. Endo/Heme/Allergies: Positive for environmental allergies. Psychiatric/Behavioral: Negative for depression and hallucinations. The patient is not nervous/anxious. PHYSICAL EXAM   /66   Pulse (!) 56   Temp 97.1 °F (36.2 °C) (Temporal)   Resp 12   Ht 5' 9\" (1.753 m)   Wt 148 lb (67.1 kg)   SpO2 95%   BMI 21.86 kg/m²      Physical Exam  Constitutional:       General: He is not in acute distress. Appearance: He is not ill-appearing or diaphoretic. Cardiovascular:      Rate and Rhythm: Regular rhythm. Bradycardia present. Pulses: Normal pulses. Heart sounds: Normal heart sounds. No murmur heard.       Pulmonary:      Effort: Pulmonary effort is normal. No respiratory distress. Breath sounds: Normal breath sounds. No wheezing. Abdominal:      General: Abdomen is flat. Bowel sounds are normal.      Palpations: Abdomen is soft. Neurological:      General: No focal deficit present. Mental Status: He is alert. Cranial Nerves: No cranial nerve deficit. Sensory: No sensory deficit. Motor: No weakness. Psychiatric:         Mood and Affect: Mood normal.            ASSESSMENT/ PLAN   Below is the assessment and plan developed based on review of pertinent history, physical exam, labs, studies, and medications. 1. Moderate persistent asthma without complication  -     fluticasone propion-salmeteroL (ADVAIR/WIXELA) 250-50 mcg/dose diskus inhaler; Take 1 Puff by inhalation every twelve (12) hours for 90 days. , Normal, Disp-180 Each, R-0  -     albuterol sulfate (PROAIR RESPICLICK) 90 mcg/actuation breath activated inhaler; Take 2 Puffs by inhalation every four (4) hours as needed for Wheezing or Shortness of Breath., Normal, Disp-1 Each, R-1  -     montelukast (SINGULAIR) 10 mg tablet; Take 1 Tablet by mouth daily for 90 days. , Normal, Disp-90 Tablet, R-0  2. Acquired hypothyroidism  Pending labs, will adjust dosage of Synthroid. -     TSH 3RD GENERATION; Future  -     T4, FREE; Future  -     levothyroxine (SYNTHROID) 100 mcg tablet; Take 1 Tablet by mouth Daily (before breakfast). , Normal, Disp-30 Tablet, R-0  3. Schizoaffective disorder, unspecified type ([facility]  Managed primarily by Psych. 4. Recurrent major depressive disorder, in partial remission ([facility]  Managed primarily by Psych. 5. Sinus bradycardia by electrocardiogram  Likely due to Propanolol. However, if his TSH is abnormal, then I will adjust his Synthroid. I also discussed with the caregiver to contact Psychiatry to examine his Psych meds that may cause Bradycardia. Cardiac precautions stressed. -     METABOLIC PANEL, COMPREHENSIVE; Future  -     TSH 3RD GENERATION;  Future  - T4, FREE; Future  -     AMB POC EKG ROUTINE W/ 12 LEADS, INTER & REP  6. Benign prostatic hyperplasia without lower urinary tract symptoms  Managed primarily by Urology. Advised caregiver to take BP daily and if below 95/50, do not give Flomax. 7. Vitamin D deficiency disease  Pending labs, will adjust supplemental Vitamin D.   -     VITAMIN D, 25 HYDROXY; Future  -     cholecalciferol, vitamin D3, 50 mcg (2,000 unit) tab; Take 1 Tablet by mouth daily. , Normal, Disp-90 Tablet, R-3  8. Chronic bilateral low back pain without sciatica  Continue with dosage/frequency of Gabapentin. 9. Mild intellectual disability  10. Bilateral hearing loss, unspecified hearing loss type  Managed primarily by Audiology. Continue with hearing aids. 11. Nystagmus  Has not developed any complications from this disorder. Advised him to establish care with Opthalmology. 12. Chronic constipation  -     docusate sodium (DOK) 100 mg capsule; 1 CAPSULE BY MOUTH EVERY DAY, Normal, Disp-90 Capsule, R-3  13. Acne, unspecified acne type  Continue with prn Clindamycin gel. 14. Environmental and seasonal allergies  -     fluticasone propionate (FLONASE) 50 mcg/actuation nasal spray; BLOW NOSE: THEN USE 2 SPRAYS IN EACH NOSTRIL 2 TIMES A DAY AS NEEDED FOR CONGESTION, Normal, Disp-16 g, R-2  15. Cigarette smoker  -     CBC WITH AUTOMATED DIFF; Future  -     nicotine (NICODERM CQ) 7 mg/24 hr; 1 Patch by TransDERmal route every twenty-four (24) hours for 56 days. , Normal, Disp-56 Patch, R-0  16. Encounter for smoking cessation counseling  -     nicotine (NICODERM CQ) 7 mg/24 hr; 1 Patch by TransDERmal route every twenty-four (24) hours for 56 days. , Normal, Disp-56 Patch, R-0  17. Medication management  -     CBC WITH AUTOMATED DIFF; Future  -     METABOLIC PANEL, COMPREHENSIVE; Future  -     LIPID PANEL; Future  -     HEMOGLOBIN A1C WITH EAG; Future  -     AMB POC EKG ROUTINE W/ 12 LEADS, INTER & REP  18.  Encounter for immunization   Advised caregiver to get PCV-20 vaccine at a local pharmacy. Follow-up and Dispositions    · Return in about 4 weeks (around 8/11/2022) for Re-assess HR w/ cessation of Propanolol . Disclaimer:  Advised patient to call back or return to office if symptoms worsen/change/persist.  Discussed expected course/resolution/complications of diagnosis in detail with patient. Medication risks/benefits/alternatives discussed with patient. Patient was given an after visit summary which includes diagnoses, current medications, & vitals. Discussed patient instructions and advised to read to all patient instructions regarding care. Patient expressed understanding with the diagnosis and plan.        Nitin Zhou NP  7/14/2022

## 2022-07-15 LAB
25(OH)D3+25(OH)D2 SERPL-MCNC: 42.7 NG/ML (ref 30–100)
ALBUMIN SERPL-MCNC: 4.9 G/DL (ref 4–5)
ALBUMIN/GLOB SERPL: 2 {RATIO} (ref 1.2–2.2)
ALP SERPL-CCNC: 95 IU/L (ref 44–121)
ALT SERPL-CCNC: 15 IU/L (ref 0–44)
AST SERPL-CCNC: 17 IU/L (ref 0–40)
BASOPHILS # BLD AUTO: 0.1 X10E3/UL (ref 0–0.2)
BASOPHILS NFR BLD AUTO: 1 %
BILIRUB SERPL-MCNC: 0.4 MG/DL (ref 0–1.2)
BUN SERPL-MCNC: 16 MG/DL (ref 6–24)
BUN/CREAT SERPL: 20 (ref 9–20)
CALCIUM SERPL-MCNC: 10 MG/DL (ref 8.7–10.2)
CHLORIDE SERPL-SCNC: 103 MMOL/L (ref 96–106)
CHOLEST SERPL-MCNC: 193 MG/DL (ref 100–199)
CO2 SERPL-SCNC: 23 MMOL/L (ref 20–29)
CREAT SERPL-MCNC: 0.8 MG/DL (ref 0.76–1.27)
EGFR: 115 ML/MIN/1.73
EOSINOPHIL # BLD AUTO: 0.2 X10E3/UL (ref 0–0.4)
EOSINOPHIL NFR BLD AUTO: 3 %
ERYTHROCYTE [DISTWIDTH] IN BLOOD BY AUTOMATED COUNT: 13.3 % (ref 11.6–15.4)
EST. AVERAGE GLUCOSE BLD GHB EST-MCNC: 105 MG/DL
GLOBULIN SER CALC-MCNC: 2.5 G/DL (ref 1.5–4.5)
GLUCOSE SERPL-MCNC: 78 MG/DL (ref 65–99)
HBA1C MFR BLD: 5.3 % (ref 4.8–5.6)
HCT VFR BLD AUTO: 50.3 % (ref 37.5–51)
HDLC SERPL-MCNC: 52 MG/DL
HGB BLD-MCNC: 16.7 G/DL (ref 13–17.7)
IMM GRANULOCYTES # BLD AUTO: 0 X10E3/UL (ref 0–0.1)
IMM GRANULOCYTES NFR BLD AUTO: 0 %
LDLC SERPL CALC-MCNC: 126 MG/DL (ref 0–99)
LYMPHOCYTES # BLD AUTO: 1.6 X10E3/UL (ref 0.7–3.1)
LYMPHOCYTES NFR BLD AUTO: 19 %
MCH RBC QN AUTO: 29.6 PG (ref 26.6–33)
MCHC RBC AUTO-ENTMCNC: 33.2 G/DL (ref 31.5–35.7)
MCV RBC AUTO: 89 FL (ref 79–97)
MONOCYTES # BLD AUTO: 0.6 X10E3/UL (ref 0.1–0.9)
MONOCYTES NFR BLD AUTO: 7 %
NEUTROPHILS # BLD AUTO: 6.2 X10E3/UL (ref 1.4–7)
NEUTROPHILS NFR BLD AUTO: 70 %
PLATELET # BLD AUTO: 251 X10E3/UL (ref 150–450)
POTASSIUM SERPL-SCNC: 4.9 MMOL/L (ref 3.5–5.2)
PROT SERPL-MCNC: 7.4 G/DL (ref 6–8.5)
RBC # BLD AUTO: 5.64 X10E6/UL (ref 4.14–5.8)
SODIUM SERPL-SCNC: 139 MMOL/L (ref 134–144)
T4 FREE SERPL-MCNC: 1.14 NG/DL (ref 0.82–1.77)
TRIGL SERPL-MCNC: 82 MG/DL (ref 0–149)
TSH SERPL DL<=0.005 MIU/L-ACNC: 1.16 UIU/ML (ref 0.45–4.5)
VLDLC SERPL CALC-MCNC: 15 MG/DL (ref 5–40)
WBC # BLD AUTO: 8.7 X10E3/UL (ref 3.4–10.8)

## 2022-07-15 NOTE — PROGRESS NOTES
LDL is mildly elevated. He needs to limit his red meat/fatty food/shellfish. Will re-check in 6 months. All other labs are unremarkable.

## 2022-08-02 DIAGNOSIS — E03.9 ACQUIRED HYPOTHYROIDISM: ICD-10-CM

## 2022-08-02 DIAGNOSIS — J45.40 MODERATE PERSISTENT ASTHMA WITHOUT COMPLICATION: ICD-10-CM

## 2022-08-02 DIAGNOSIS — Z71.6 ENCOUNTER FOR SMOKING CESSATION COUNSELING: ICD-10-CM

## 2022-08-02 DIAGNOSIS — F17.210 CIGARETTE SMOKER: ICD-10-CM

## 2022-08-03 RX ORDER — LEVOTHYROXINE SODIUM 100 UG/1
100 TABLET ORAL
Qty: 90 TABLET | Refills: 0 | Status: SHIPPED | OUTPATIENT
Start: 2022-08-03 | End: 2022-09-29 | Stop reason: SDUPTHER

## 2022-08-03 RX ORDER — MONTELUKAST SODIUM 10 MG/1
10 TABLET ORAL DAILY
Qty: 90 TABLET | Refills: 3 | Status: SHIPPED | OUTPATIENT
Start: 2022-08-03

## 2022-08-03 RX ORDER — NICOTINE 7MG/24HR
1 PATCH, TRANSDERMAL 24 HOURS TRANSDERMAL EVERY 24 HOURS
Qty: 56 PATCH | Refills: 0 | Status: SHIPPED | OUTPATIENT
Start: 2022-08-03 | End: 2022-08-15

## 2022-08-03 NOTE — TELEPHONE ENCOUNTER
PCP: Fam Ornelas NP    Last appt: 2022  Future Appointments   Date Time Provider Jaymie Giraldo   8/15/2022 10:00 AM Fam Ornelas NP [facility]       Requested Prescriptions     Pending Prescriptions Disp Refills    levothyroxine (SYNTHROID) 100 mcg tablet 30 Tablet 0     Sig: Take 1 Tablet by mouth Daily (before breakfast). montelukast (SINGULAIR) 10 mg tablet 90 Tablet 0     Sig: Take 1 Tablet by mouth in the morning for 90 days. nicotine (NICODERM CQ) 7 mg/24 hr 56 Patch 0     Si Patch by TransDERmal route every twenty-four (24) hours for 56 days.          Other Comments: last refill on all requested medications 22

## 2022-08-15 ENCOUNTER — OFFICE VISIT (OUTPATIENT)
Dept: PRIMARY CARE CLINIC | Age: 42
End: 2022-08-15
Payer: MEDICAID

## 2022-08-15 VITALS
RESPIRATION RATE: 12 BRPM | OXYGEN SATURATION: 97 % | HEART RATE: 56 BPM | WEIGHT: 155 LBS | DIASTOLIC BLOOD PRESSURE: 86 MMHG | TEMPERATURE: 97.8 F | HEIGHT: 69 IN | SYSTOLIC BLOOD PRESSURE: 113 MMHG | BODY MASS INDEX: 22.96 KG/M2

## 2022-08-15 DIAGNOSIS — J45.40 MODERATE PERSISTENT ASTHMA WITHOUT COMPLICATION: Primary | ICD-10-CM

## 2022-08-15 DIAGNOSIS — F17.200 CURRENT SMOKER: ICD-10-CM

## 2022-08-15 DIAGNOSIS — E78.00 ELEVATED LDL CHOLESTEROL LEVEL: ICD-10-CM

## 2022-08-15 DIAGNOSIS — R00.1 SINUS BRADYCARDIA BY ELECTROCARDIOGRAM: ICD-10-CM

## 2022-08-15 DIAGNOSIS — F70 MILD INTELLECTUAL DISABILITY: ICD-10-CM

## 2022-08-15 PROCEDURE — 99214 OFFICE O/P EST MOD 30 MIN: CPT | Performed by: NURSE PRACTITIONER

## 2022-08-15 NOTE — PROGRESS NOTES
Chief Complaint   Patient presents with    Follow-up    Medication Problem     Group home staff states that pt is still smoking with nicotine patch- would like medication d/c- pt states that he is not going to quit smoking-        Health Maintenance Due   Topic    Depression Monitoring         1. Have you been to the ER, urgent care clinic since your last visit? Hospitalized since your last visit? No    2. Have you seen or consulted any other health care providers outside of the [address] [place] [address] since your last visit? Include any pap smears or colon screening.  No    Visit Vitals  /86 (BP 1 Location: Right arm, BP Patient Position: Sitting, BP Cuff Size: Adult)   Pulse (!) 56   Temp 97.8 °F (36.6 °C) (Temporal)   Resp 12   Ht 5' 9\" (1.753 m)   Wt 155 lb (70.3 kg)   SpO2 97%   BMI 22.89 kg/m²

## 2022-08-15 NOTE — PROGRESS NOTES
[facility]   [address] [facility], [address] [place] [address]  P: [phone]  F: [phone]    SUBJECTIVE     HPI:     Efrain Pryor is a 38 y.o. male who is seen in the clinic for   Chief Complaint   Patient presents with    Follow-up    Medication Problem     Group home staff states that pt is still smoking with nicotine patch- would like medication d/c- pt states that he is not going to quit smoking-       The patient presents today for a follow-up on a recent visit with me on 07/14. During the visit, the patient complained of worsening Asthma symptoms. Advair and Singulair was added to the patient's medication regimen. Also, nicotine patches were added for smoking cessation. Since then, his Asthma has been better controlled. However, he continues to smoke daily and does not want to quit smoking. His HR was in the 50's at the last appointment. EKG showed Sinus Bradycardia. Told caregiver to d/c Propanolol. D/c'd in EMR as well. However, the caregiver states that the pharmacy \"never received the d/c order for the Propanolol and thus, we still kept giving his Propanolol. HR is 56 today and BP is 113/86. Denies any s/s associated with low HR. Routine labs were ordered at the conclusion of his last visit. TSH and T4 were normal while on Synthroid 100 mcg once daily. All other labs were unremarkable other than his LDL being mildly elevated. Murali Chavez was told to modify his diet. Diet has since improved. The patient has a mild intellectual disability and thus resides in a [facility]. Caregiver is present and speaking for the patient at this time.      Patient Active Problem List    Diagnosis    Depressive disorder    Mild intellectual disability    Schizoaffective disorder ([facility] [address]    Acute left-sided low back pain without sciatica    Acquired hypothyroidism    Acne    Nystagmus        Past Medical History:   Diagnosis Date    Depressive disorder     Hypothyroid     Schizoaffective disorder ([facility] [address] Uses hearing aid      History reviewed. No pertinent surgical history. Social History     Socioeconomic History    Marital status: SINGLE     Spouse name: Not on file    Number of children: Not on file    Years of education: Not on file    Highest education level: Not on file   Occupational History    Not on file   Tobacco Use    Smoking status: Every Day     Packs/day: 0.25     Types: Cigarettes    Smokeless tobacco: Current   Vaping Use    Vaping Use: Never used   Substance and Sexual Activity    Alcohol use: No    Drug use: No    Sexual activity: Never   Other Topics Concern    Not on file   Social History Narrative    Not on file     Social Determinants of Health     Financial Resource Strain: Not on file   Food Insecurity: Not on file   Transportation Needs: Not on file   Physical Activity: Not on file   Stress: Not on file   Social Connections: Not on file   Intimate Partner Violence: Not on file   Housing Stability: Not on file     History reviewed. No pertinent family history.   Immunization History   Administered Date(s) Administered    COVID-19, MODERNA BLUE border, Primary or Immunocompromised, (age 18y+), IM, 100 mcg/0.5mL 01/22/2021, 03/02/2021    COVID-19, MODERNA Booster BLUE border, (age 18y+), IM, 50mcg/0.25mL 11/18/2021    Influenza Vaccine (Quad) PF (>6 Mo Flulaval, Fluarix, and >3 Yrs Afluria, Fluzone 64819) 11/13/2019, 10/13/2020, 10/18/2021    Tdap 06/08/2016      No Known Allergies    Office Visit on 07/14/2022   Component Date Value Ref Range Status    WBC 07/14/2022 8.7  3.4 - 10.8 x10E3/uL Final    RBC 07/14/2022 5.64  4.14 - 5.80 x10E6/uL Final    HGB 07/14/2022 16.7  13.0 - 17.7 g/dL Final    HCT 07/14/2022 50.3  37.5 - 51.0 % Final    MCV 07/14/2022 89  79 - 97 fL Final    MCH 07/14/2022 29.6  26.6 - 33.0 pg Final    MCHC 07/14/2022 33.2  31.5 - 35.7 g/dL Final    RDW 07/14/2022 13.3  11.6 - 15.4 % Final    PLATELET 68/94/3530 806  150 - 450 x10E3/uL Final    NEUTROPHILS 07/14/2022 70  Not Estab. % Final    Lymphocytes 07/14/2022 19  Not Estab. % Final    MONOCYTES 07/14/2022 7  Not Estab. % Final    EOSINOPHILS 07/14/2022 3  Not Estab. % Final    BASOPHILS 07/14/2022 1  Not Estab. % Final    ABS. NEUTROPHILS 07/14/2022 6.2  1.4 - 7.0 x10E3/uL Final    Abs Lymphocytes 07/14/2022 1.6  0.7 - 3.1 x10E3/uL Final    ABS. MONOCYTES 07/14/2022 0.6  0.1 - 0.9 x10E3/uL Final    ABS. EOSINOPHILS 07/14/2022 0.2  0.0 - 0.4 x10E3/uL Final    ABS. BASOPHILS 07/14/2022 0.1  0.0 - 0.2 x10E3/uL Final    IMMATURE GRANULOCYTES 07/14/2022 0  Not Estab. % Final    ABS. IMM. GRANS. 07/14/2022 0.0  0.0 - 0.1 x10E3/uL Final    Glucose 07/14/2022 78  65 - 99 mg/dL Final    BUN 07/14/2022 16  6 - 24 mg/dL Final    Creatinine 07/14/2022 0.80  0.76 - 1.27 mg/dL Final    eGFR 07/14/2022 115  >59 mL/min/1.73 Final    BUN/Creatinine ratio 07/14/2022 20  9 - 20 Final    Sodium 07/14/2022 139  134 - 144 mmol/L Final    Potassium 07/14/2022 4.9  3.5 - 5.2 mmol/L Final    Chloride 07/14/2022 103  96 - 106 mmol/L Final    CO2 07/14/2022 23  20 - 29 mmol/L Final    Calcium 07/14/2022 10.0  8.7 - 10.2 mg/dL Final    Protein, total 07/14/2022 7.4  6.0 - 8.5 g/dL Final    Albumin 07/14/2022 4.9  4.0 - 5.0 g/dL Final    GLOBULIN, TOTAL 07/14/2022 2.5  1.5 - 4.5 g/dL Final    A-G Ratio 07/14/2022 2.0  1.2 - 2.2 Final    Bilirubin, total 07/14/2022 0.4  0.0 - 1.2 mg/dL Final    Alk.  phosphatase 07/14/2022 95  44 - 121 IU/L Final    AST (SGOT) 07/14/2022 17  0 - 40 IU/L Final    ALT (SGPT) 07/14/2022 15  0 - 44 IU/L Final    Cholesterol, total 07/14/2022 193  100 - 199 mg/dL Final    Triglyceride 07/14/2022 82  0 - 149 mg/dL Final    HDL Cholesterol 07/14/2022 52  >39 mg/dL Final    VLDL, calculated 07/14/2022 15  5 - 40 mg/dL Final    LDL, calculated 07/14/2022 126 (A) 0 - 99 mg/dL Final    Hemoglobin A1c 07/14/2022 5.3  4.8 - 5.6 % Final    Comment:          Prediabetes: 5.7 - 6.4           Diabetes: >6.4           Glycemic control for adults with diabetes: <7.0      Estimated average glucose 07/14/2022 105  mg/dL Final    TSH 07/14/2022 1.160  0.450 - 4.500 uIU/mL Final    T4, Free 07/14/2022 1.14  0.82 - 1.77 ng/dL Final    VITAMIN D, 25-HYDROXY 07/14/2022 42.7  30.0 - 100.0 ng/mL Final    Comment: Vitamin D deficiency has been defined by the [address] practice guideline as a  level of serum 25-OH vitamin D less than 20 ng/mL (1,2). The Endocrine Society went on to further define vitamin D  insufficiency as a level between 21 and 29 ng/mL (2). 1. IOM ([place] of [employer]). 2010. Dietary reference     intakes for calcium and D. [facility]: The     [employer]ide[employer]. 2. Tania MF, Marin VAZQUEZ, Carmen KLEIN, et al.     Evaluation, treatment, and prevention of vitamin D     deficiency: an Endocrine Society clinical practice     guideline. JCEM. 2011 Jul; 96(7):1911-30. XR CHEST PA LAT  Narrative: Exam:  2 view chest    Indication: Mild persistent asthma. COMPARISON: 12/27/2016    PA and lateral views demonstrate normal heart size. There is no acute process in  the lung fields. The overall aeration has improved. The osseous structures are  unremarkable. Impression: Impression: No acute process. No pneumonia     Current Outpatient Medications   Medication Sig Dispense Refill    levothyroxine (SYNTHROID) 100 mcg tablet Take 1 Tablet by mouth Daily (before breakfast). 90 Tablet 0    montelukast (SINGULAIR) 10 mg tablet Take 1 Tablet by mouth in the morning. 90 Tablet 3    fluticasone propion-salmeteroL (ADVAIR/WIXELA) 250-50 mcg/dose diskus inhaler Take 1 Puff by inhalation every twelve (12) hours for 90 days. 180 Each 0    albuterol sulfate (PROAIR RESPICLICK) 90 mcg/actuation breath activated inhaler Take 2 Puffs by inhalation every four (4) hours as needed for Wheezing or Shortness of Breath.  1 Each 1    fluticasone propionate (FLONASE) 50 mcg/actuation nasal spray BLOW NOSE: THEN USE 2 SPRAYS IN EACH NOSTRIL 2 TIMES A DAY AS NEEDED FOR CONGESTION 16 g 2    docusate sodium (DOK) 100 mg capsule 1 CAPSULE BY MOUTH EVERY DAY 90 Capsule 3    cholecalciferol, vitamin D3, 50 mcg (2,000 unit) tab Take 1 Tablet by mouth daily. 90 Tablet 3    acetaminophen (TYLENOL) 500 mg tablet TAKE 1 TABLET BY MOUTH EVERY 6 HOURS AS NEEDED FOR PAIN OR FEVER 30 Tablet 2    sunscreen 15 SPF lotion Use it as needed. 1 Bottle 1    tamsulosin (FLOMAX) 0.4 mg capsule Take 0.4 mg by mouth daily. gabapentin (NEURONTIN) 400 mg capsule Take 800 mg by mouth two (2) times a day. lithium carbonate (LITHOBID) 150 mg capsule Take 450 mg by mouth every morning. lithium carbonate (LITHONATE) 300 mg capsule Take 600 mg by mouth nightly. morning       OLANZapine (ZYPREXA) 15 mg tablet Take 30 mg by mouth nightly. QUEtiapine (SEROQUEL) 400 mg tablet Take 400 mg by mouth two (2) times a day. sertraline (ZOLOFT) 50 mg tablet Take 50 mg by mouth nightly. The past medical history, past surgical history, and family history were reviewed and updated in the medical record. Lab values/Imaging were reviewed. The medications were reviewed and updated in the medical record. Immunizations were reviewed and updated in the medical record. All relevant preventative screenings reviewed and updated in the medical record. REVIEW OF SYSTEMS   Review of Systems   Constitutional:  Negative for malaise/fatigue. Eyes:  Negative for blurred vision, double vision and photophobia. Respiratory:  Negative for cough, sputum production, shortness of breath and wheezing. Cardiovascular:  Negative for chest pain, palpitations, orthopnea, leg swelling and PND. Neurological:  Negative for dizziness, tingling, sensory change, speech change, focal weakness, weakness and headaches. Endo/Heme/Allergies:  Negative for environmental allergies.        PHYSICAL EXAM   /86 (BP 1 Location: Right arm, BP Patient Position: Sitting, BP Cuff Size: Adult)   Pulse (!) 56   Temp 97.8 °F (36.6 °C) (Temporal)   Resp 12   Ht 5' 9\" (1.753 m)   Wt 155 lb (70.3 kg)   SpO2 97%   BMI 22.89 kg/m²      Physical Exam  Constitutional:       General: He is not in acute distress. Appearance: He is not ill-appearing or diaphoretic. Cardiovascular:      Rate and Rhythm: Regular rhythm. Bradycardia present. Pulses: Normal pulses. Heart sounds: Normal heart sounds. No murmur heard. Pulmonary:      Effort: Pulmonary effort is normal. No respiratory distress. Breath sounds: Normal breath sounds. No wheezing. Chest:      Chest wall: No tenderness. Neurological:      General: No focal deficit present. Mental Status: He is alert. Cranial Nerves: No cranial nerve deficit. Motor: No weakness. ASSESSMENT/ PLAN   Below is the assessment and plan developed based on review of pertinent history, physical exam, labs, studies, and medications. 1. Moderate persistent asthma without complication  Continue with current treatment regimen. 2. Sinus bradycardia by electrocardiogram  Provided AVS to caregiver so that she is aware of the list of medications that he is currently taking. Discussed with Arnie Morris LPN to contact the patient's pharmacy to confirm current list of medications and thus the need to stop Propanolol. 3. Mild intellectual disability  High functioning. However, resides in a group home for daily care. 4. Elevated LDL cholesterol level  Continue with modified diet. Will re-check in 5-6 months. 5. Current smoker  Patient educated on the risks involved with continuing to smoke. Follow-up and Dispositions    Return in about 4 weeks (around 9/12/2022) for Re-assess HR w/ cessation of Propanolol.          Disclaimer:  Advised patient to call back or return to office if symptoms worsen/change/persist.  Discussed expected course/resolution/complications of diagnosis in detail with patient. Medication risks/benefits/alternatives discussed with patient. Patient was given an after visit summary which includes diagnoses, current medications, & vitals. Discussed patient instructions and advised to read to all patient instructions regarding care. Patient expressed understanding with the diagnosis and plan.        Shawanda Louis NP  8/15/2022

## 2022-09-14 ENCOUNTER — OFFICE VISIT (OUTPATIENT)
Dept: PRIMARY CARE CLINIC | Age: 42
End: 2022-09-14
Payer: MEDICAID

## 2022-09-14 VITALS
DIASTOLIC BLOOD PRESSURE: 76 MMHG | OXYGEN SATURATION: 98 % | SYSTOLIC BLOOD PRESSURE: 122 MMHG | WEIGHT: 154 LBS | HEART RATE: 64 BPM | RESPIRATION RATE: 16 BRPM | BODY MASS INDEX: 22.81 KG/M2 | TEMPERATURE: 97.8 F | HEIGHT: 69 IN

## 2022-09-14 DIAGNOSIS — Z79.899 MEDICATION MANAGEMENT: ICD-10-CM

## 2022-09-14 DIAGNOSIS — E03.9 ACQUIRED HYPOTHYROIDISM: Primary | ICD-10-CM

## 2022-09-14 DIAGNOSIS — S60.419A ABRASION OF FINGER, INITIAL ENCOUNTER: ICD-10-CM

## 2022-09-14 DIAGNOSIS — Z86.79 HISTORY OF SINUS BRADYCARDIA: ICD-10-CM

## 2022-09-14 DIAGNOSIS — F70 MILD INTELLECTUAL DISABILITY: ICD-10-CM

## 2022-09-14 DIAGNOSIS — T44.7X5D: ICD-10-CM

## 2022-09-14 PROCEDURE — 99214 OFFICE O/P EST MOD 30 MIN: CPT | Performed by: NURSE PRACTITIONER

## 2022-09-14 NOTE — PROGRESS NOTES
Chief Complaint   Patient presents with    Follow-up       Health Maintenance Due   Topic    Depression Monitoring     Flu Vaccine (1)        1. Have you been to the ER, urgent care clinic since your last visit? Hospitalized since your last visit? No    2. Have you seen or consulted any other health care providers outside of the [address] [place] [address] since your last visit? Include any pap smears or colon screening.  No    Visit Vitals  Ht 5' 9\" (1.753 m)   Wt 154 lb (69.9 kg)   BMI 22.74 kg/m²

## 2022-09-14 NOTE — PROGRESS NOTES
[facility]   [address] [facility], [address] [place] [address]  P: [phone]  F: [phone]    SUBJECTIVE     HPI:     Joan Sorto is a 38 y.o. male who is seen in the clinic for   Chief Complaint   Patient presents with    Follow-up      The patient presents today for a follow up appointment with me on 08/15. During the appointment, the caregiver expressed to me that the pharmacy was \"still refilling his Propanolol\", even though a d/c order had been placed. As a result, his EKG showed Sinus Bradycardia. At the conclusion of the visit, Adwoa Paulson MA contacted the pharmacy to confirm the d/c order and faxed over the d/c order. However, the caregiver states that the patient continues to take his Propanolol daily. Upon a chart review, the medication was last prescribed by Dr. Jermaine Rider on 02/28/22. The order states the following: Propanolol 10 20 mg every day 31 tablets with 2 refills. Therefore, the patient should have ran out of the medication by now. With the assistance of the caregiver Davon Hook, we contacted the patient's pharmacy on file to determine how the medication has been refilled. We talked to Charlotte White, who states that he received a D/C order of Propanolol in July. However, Dr. Jermaine Rider (who left the practice in March, 2022) refilled the medication in August. Furthermore, Charlotte White states that the \"Propanolol is scheduled to be d/c tomorrow\". HR is 64 today. Asthma is still well controlled with Advair and Singulair. Per caregiver, the patient has not had to use prn Albuterol. Patient bumped his left finger on side of a door early today and it caused an abrasion. Denies any heavy bleeding. Requesting that routine labs be drawn today. The patient has a mild-intellectual disability and therefore requires the care of a [address]. Caregiver is present today with the patient.      Patient Active Problem List    Diagnosis    Depressive disorder    Mild intellectual disability    Schizoaffective disorder ([facility]    Acute left-sided low back pain without sciatica    Acquired hypothyroidism    Acne    Nystagmus        Past Medical History:   Diagnosis Date    Depressive disorder     Hypothyroid     [facility]     Uses hearing aid      History reviewed. No pertinent surgical history. Social History     Socioeconomic History    Marital status: SINGLE     Spouse name: Not on file    Number of children: Not on file    Years of education: Not on file    Highest education level: Not on file   Occupational History    Not on file   Tobacco Use    Smoking status: Every Day     Packs/day: 0.25     Types: Cigarettes    Smokeless tobacco: Current   Vaping Use    Vaping Use: Never used   Substance and Sexual Activity    Alcohol use: No    Drug use: No    Sexual activity: Never   Other Topics Concern    Not on file   Social History Narrative    Not on file     Social Determinants of Health     Financial Resource Strain: Not on file   Food Insecurity: Not on file   Transportation Needs: Not on file   Physical Activity: Not on file   Stress: Not on file   Social Connections: Not on file   Intimate Partner Violence: Not on file   Housing Stability: Not on file     History reviewed. No pertinent family history.   Immunization History   Administered Date(s) Administered    COVID-19, MODERNA BLUE border, Primary or Immunocompromised, (age 18y+), IM, 100 mcg/0.5mL 01/22/2021, 03/02/2021    COVID-19, MODERNA Booster BLUE border, (age 18y+), IM, 50mcg/0.25mL 11/18/2021    Influenza, FLUARIX, FLULAVAL, FLUZONE (age 10 mo+) AND AFLURIA, (age 1 y+), PF, 0.5mL 11/13/2019, 10/13/2020, 10/18/2021    Tdap 06/08/2016      No Known Allergies    Office Visit on 07/14/2022   Component Date Value Ref Range Status    WBC 07/14/2022 8.7  3.4 - 10.8 x10E3/uL Final    RBC 07/14/2022 5.64  4.14 - 5.80 x10E6/uL Final    HGB 07/14/2022 16.7  13.0 - 17.7 g/dL Final    HCT 07/14/2022 50.3  37.5 - 51.0 % Final    MCV 07/14/2022 89  79 - 97 fL Final    MCH 07/14/2022 29.6  26.6 - 33.0 pg Final    MCHC 07/14/2022 33.2  31.5 - 35.7 g/dL Final    RDW 07/14/2022 13.3  11.6 - 15.4 % Final    PLATELET 48/95/9368 516  150 - 450 x10E3/uL Final    NEUTROPHILS 07/14/2022 70  Not Estab. % Final    Lymphocytes 07/14/2022 19  Not Estab. % Final    MONOCYTES 07/14/2022 7  Not Estab. % Final    EOSINOPHILS 07/14/2022 3  Not Estab. % Final    BASOPHILS 07/14/2022 1  Not Estab. % Final    ABS. NEUTROPHILS 07/14/2022 6.2  1.4 - 7.0 x10E3/uL Final    Abs Lymphocytes 07/14/2022 1.6  0.7 - 3.1 x10E3/uL Final    ABS. MONOCYTES 07/14/2022 0.6  0.1 - 0.9 x10E3/uL Final    ABS. EOSINOPHILS 07/14/2022 0.2  0.0 - 0.4 x10E3/uL Final    ABS. BASOPHILS 07/14/2022 0.1  0.0 - 0.2 x10E3/uL Final    IMMATURE GRANULOCYTES 07/14/2022 0  Not Estab. % Final    ABS. IMM. GRANS. 07/14/2022 0.0  0.0 - 0.1 x10E3/uL Final    Glucose 07/14/2022 78  65 - 99 mg/dL Final    BUN 07/14/2022 16  6 - 24 mg/dL Final    Creatinine 07/14/2022 0.80  0.76 - 1.27 mg/dL Final    eGFR 07/14/2022 115  >59 mL/min/1.73 Final    BUN/Creatinine ratio 07/14/2022 20  9 - 20 Final    Sodium 07/14/2022 139  134 - 144 mmol/L Final    Potassium 07/14/2022 4.9  3.5 - 5.2 mmol/L Final    Chloride 07/14/2022 103  96 - 106 mmol/L Final    CO2 07/14/2022 23  20 - 29 mmol/L Final    Calcium 07/14/2022 10.0  8.7 - 10.2 mg/dL Final    Protein, total 07/14/2022 7.4  6.0 - 8.5 g/dL Final    Albumin 07/14/2022 4.9  4.0 - 5.0 g/dL Final    GLOBULIN, TOTAL 07/14/2022 2.5  1.5 - 4.5 g/dL Final    A-G Ratio 07/14/2022 2.0  1.2 - 2.2 Final    Bilirubin, total 07/14/2022 0.4  0.0 - 1.2 mg/dL Final    Alk.  phosphatase 07/14/2022 95  44 - 121 IU/L Final    AST (SGOT) 07/14/2022 17  0 - 40 IU/L Final    ALT (SGPT) 07/14/2022 15  0 - 44 IU/L Final    Cholesterol, total 07/14/2022 193  100 - 199 mg/dL Final    Triglyceride 07/14/2022 82  0 - 149 mg/dL Final    HDL Cholesterol 07/14/2022 52  >39 mg/dL Final VLDL, calculated 07/14/2022 15  5 - 40 mg/dL Final    LDL, calculated 07/14/2022 126 (A) 0 - 99 mg/dL Final    Hemoglobin A1c 07/14/2022 5.3  4.8 - 5.6 % Final    Comment:          Prediabetes: 5.7 - 6.4           Diabetes: >6.4           Glycemic control for adults with diabetes: <7.0      Estimated average glucose 07/14/2022 105  mg/dL Final    TSH 07/14/2022 1.160  0.450 - 4.500 uIU/mL Final    T4, Free 07/14/2022 1.14  0.82 - 1.77 ng/dL Final    VITAMIN D, 25-HYDROXY 07/14/2022 42.7  30.0 - 100.0 ng/mL Final    Comment: Vitamin D deficiency has been defined by the [place] and an Endocrine Society practice guideline as a  level of serum 25-OH vitamin D less than 20 ng/mL (1,2). The Endocrine Society went on to further define vitamin D  insufficiency as a level between 21 and 29 ng/mL (2). 1. IOM ([employer]). 2010. Dietary reference     intakes for calcium and D. [facility]: The     NeXeption. 2. Tania MF, Marin NC, Carmen KLEIN, et al.     Evaluation, treatment, and prevention of vitamin D     deficiency: an Endocrine Society clinical practice     guideline. JCEM. 2011 Jul; 96(7):1911-30. XR CHEST PA LAT  Narrative: Exam:  2 view chest    Indication: Mild persistent asthma. COMPARISON: 12/27/2016    PA and lateral views demonstrate normal heart size. There is no acute process in  the lung fields. The overall aeration has improved. The osseous structures are  unremarkable. Impression: Impression: No acute process. No pneumonia     Current Outpatient Medications   Medication Sig Dispense Refill    fluticasone propion-salmeteroL (ADVAIR/WIXELA) 250-50 mcg/dose diskus inhaler Take 1 Puff by inhalation every twelve (12) hours. 180 Each 1    levothyroxine (SYNTHROID) 100 mcg tablet Take 1 Tablet by mouth Daily (before breakfast). 90 Tablet 0    montelukast (SINGULAIR) 10 mg tablet Take 1 Tablet by mouth in the morning.  90 Tablet 3    albuterol sulfate (PROAIR RESPICLICK) 90 mcg/actuation breath activated inhaler Take 2 Puffs by inhalation every four (4) hours as needed for Wheezing or Shortness of Breath. 1 Each 1    fluticasone propionate (FLONASE) 50 mcg/actuation nasal spray BLOW NOSE: THEN USE 2 SPRAYS IN EACH NOSTRIL 2 TIMES A DAY AS NEEDED FOR CONGESTION 16 g 2    docusate sodium (DOK) 100 mg capsule 1 CAPSULE BY MOUTH EVERY DAY 90 Capsule 3    cholecalciferol, vitamin D3, 50 mcg (2,000 unit) tab Take 1 Tablet by mouth daily. 90 Tablet 3    acetaminophen (TYLENOL) 500 mg tablet TAKE 1 TABLET BY MOUTH EVERY 6 HOURS AS NEEDED FOR PAIN OR FEVER 30 Tablet 2    sunscreen 15 SPF lotion Use it as needed. 1 Bottle 1    tamsulosin (FLOMAX) 0.4 mg capsule Take 0.4 mg by mouth daily. gabapentin (NEURONTIN) 400 mg capsule Take 800 mg by mouth two (2) times a day. lithium carbonate (LITHOBID) 150 mg capsule Take 450 mg by mouth every morning. lithium carbonate (LITHONATE) 300 mg capsule Take 600 mg by mouth nightly. morning       OLANZapine (ZYPREXA) 15 mg tablet Take 30 mg by mouth nightly. QUEtiapine (SEROQUEL) 400 mg tablet Take 400 mg by mouth two (2) times a day. sertraline (ZOLOFT) 50 mg tablet Take 50 mg by mouth nightly. The past medical history, past surgical history, and family history were reviewed and updated in the medical record. Lab values/Imaging were reviewed. The medications were reviewed and updated in the medical record. Immunizations were reviewed and updated in the medical record. All relevant preventative screenings reviewed and updated in the medical record. REVIEW OF SYSTEMS   Review of Systems   Constitutional:  Negative for malaise/fatigue and weight loss. HENT:  Negative for congestion and sore throat. Eyes:  Negative for blurred vision. Respiratory:  Negative for cough, shortness of breath and wheezing. Cardiovascular:  Negative for chest pain, palpitations and leg swelling. Gastrointestinal:  Negative for constipation and heartburn. Genitourinary:  Negative for frequency and urgency. Musculoskeletal:  Negative for back pain, joint pain and myalgias. Skin:         Abrasio on left 2nd finger   Neurological:  Negative for dizziness and headaches. Psychiatric/Behavioral:  Negative for depression. The patient is not nervous/anxious and does not have insomnia. PHYSICAL EXAM   /76 (BP 1 Location: Left upper arm, BP Patient Position: Sitting, BP Cuff Size: Adult)   Pulse 64   Temp 97.8 °F (36.6 °C) (Temporal)   Resp 16   Ht 5' 9\" (1.753 m)   Wt 154 lb (69.9 kg)   SpO2 98%   BMI 22.74 kg/m²      Physical Exam  Vitals and nursing note reviewed. Constitutional:       General: He is not in acute distress. Appearance: Normal appearance. He is well-developed. He is not diaphoretic. HENT:      Head: Normocephalic and atraumatic. Right Ear: Tympanic membrane, ear canal and external ear normal.      Left Ear: Tympanic membrane, ear canal and external ear normal.      Mouth/Throat:      Mouth: Mucous membranes are moist.      Pharynx: Oropharynx is clear. Eyes:      General: No scleral icterus. Right eye: No discharge. Left eye: No discharge. Extraocular Movements: Extraocular movements intact. Conjunctiva/sclera: Conjunctivae normal.   Neck:      Thyroid: No thyromegaly. Cardiovascular:      Rate and Rhythm: Normal rate and regular rhythm. Pulses: Normal pulses. Dorsalis pedis pulses are 2+ on the right side and 2+ on the left side. Pulmonary:      Effort: Pulmonary effort is normal.      Breath sounds: Normal breath sounds. No wheezing. Abdominal:      General: Bowel sounds are normal. There is no distension. Palpations: Abdomen is soft. Tenderness: no abdominal tenderness   Musculoskeletal:      Cervical back: Normal range of motion and neck supple. Right lower leg: No edema.       Left lower leg: No edema. Comments: B/L knees without crepitus   Lymphadenopathy:      Cervical: No cervical adenopathy. Skin:     Comments: Bleeding, small abrasion on left 2nd finger. ASSESSMENT/ PLAN   Below is the assessment and plan developed based on review of pertinent history, physical exam, labs, studies, and medications. 1. Acquired hypothyroidism  Pending labs, will adjust Synthroid dose.   -     THYROID CASCADE PROFILE; Future  2. Abrasion of finger, initial encounter  Applied alcohol and a bandage to the area. 3. Adverse effect of propranolol, subsequent encounter  Verbalized to Olen Schwab at the pharmacy that the medication should be d/c'd. He verbalized understanding and will not refill it in the future. I also asked him to perform an internal audit to determine how a medication can be refilled by a provider who is no longer at our practice. He states that an audit will be performed and he will contact us back with the results. AVS summary provided to the caregiver today. 4. Mild intellectual disability  He is high-functioning. 5. History of sinus bradycardia  Due to Propanolol. 6. Medication management  -     METABOLIC PANEL, COMPREHENSIVE; Future     Eunice Shipman LPN was present during our three-way conversation with [employer]. Follow-up and Dispositions    Return in about 4 weeks (around 10/12/2022) for Re-assess HR w/ d/c of Propanolol. Disclaimer:  Advised patient to call back or return to office if symptoms worsen/change/persist.  Discussed expected course/resolution/complications of diagnosis in detail with patient. Medication risks/benefits/alternatives discussed with patient. Patient was given an after visit summary which includes diagnoses, current medications, & vitals. Discussed patient instructions and advised to read to all patient instructions regarding care. Patient expressed understanding with the diagnosis and plan.        Nick Nance NEELA Jacob  9/14/2022

## 2022-10-05 LAB
ALBUMIN SERPL-MCNC: 4.5 G/DL (ref 4–5)
ALBUMIN/GLOB SERPL: 1.7 {RATIO} (ref 1.2–2.2)
ALP SERPL-CCNC: 107 IU/L (ref 44–121)
ALT SERPL-CCNC: 12 IU/L (ref 0–44)
AST SERPL-CCNC: 17 IU/L (ref 0–40)
BILIRUB SERPL-MCNC: 0.2 MG/DL (ref 0–1.2)
BUN SERPL-MCNC: 9 MG/DL (ref 6–24)
BUN/CREAT SERPL: 13 (ref 9–20)
CALCIUM SERPL-MCNC: 9.7 MG/DL (ref 8.7–10.2)
CHLORIDE SERPL-SCNC: 99 MMOL/L (ref 96–106)
CO2 SERPL-SCNC: 20 MMOL/L (ref 20–29)
CREAT SERPL-MCNC: 0.68 MG/DL (ref 0.76–1.27)
EGFR: 121 ML/MIN/1.73
GLOBULIN SER CALC-MCNC: 2.6 G/DL (ref 1.5–4.5)
GLUCOSE SERPL-MCNC: 82 MG/DL (ref 70–99)
POTASSIUM SERPL-SCNC: 4.5 MMOL/L (ref 3.5–5.2)
PROT SERPL-MCNC: 7.1 G/DL (ref 6–8.5)
SODIUM SERPL-SCNC: 136 MMOL/L (ref 134–144)
TSH SERPL DL<=0.005 MIU/L-ACNC: 1.3 UIU/ML (ref 0.45–4.5)

## 2022-10-07 ENCOUNTER — OFFICE VISIT (OUTPATIENT)
Dept: URGENT CARE | Age: 42
End: 2022-10-07
Payer: MEDICAID

## 2022-10-07 VITALS
OXYGEN SATURATION: 98 % | SYSTOLIC BLOOD PRESSURE: 108 MMHG | HEART RATE: 97 BPM | BODY MASS INDEX: 22.74 KG/M2 | RESPIRATION RATE: 17 BRPM | DIASTOLIC BLOOD PRESSURE: 62 MMHG | TEMPERATURE: 98.6 F | WEIGHT: 154 LBS

## 2022-10-07 DIAGNOSIS — H65.01 NON-RECURRENT ACUTE SEROUS OTITIS MEDIA OF RIGHT EAR: Primary | ICD-10-CM

## 2022-10-07 DIAGNOSIS — H60.392 OTHER INFECTIVE ACUTE OTITIS EXTERNA OF LEFT EAR: ICD-10-CM

## 2022-10-07 PROCEDURE — 99213 OFFICE O/P EST LOW 20 MIN: CPT | Performed by: FAMILY MEDICINE

## 2022-10-07 RX ORDER — AMOXICILLIN 875 MG/1
875 TABLET, FILM COATED ORAL 2 TIMES DAILY
Qty: 20 TABLET | Refills: 0 | Status: SHIPPED | OUTPATIENT
Start: 2022-10-07 | End: 2022-10-17

## 2022-10-07 RX ORDER — NEOMYCIN SULFATE, POLYMYXIN B SULFATE AND HYDROCORTISONE 10; 3.5; 1 MG/ML; MG/ML; [USP'U]/ML
3 SUSPENSION/ DROPS AURICULAR (OTIC) 4 TIMES DAILY
Qty: 5 ML | Refills: 0 | Status: SHIPPED | OUTPATIENT
Start: 2022-10-07

## 2022-10-07 NOTE — PROGRESS NOTES
Ear Pain  This is a new problem. The current episode started more than 2 days ago. The problem occurs constantly. The problem has not changed since onset. Pertinent negatives include no chest pain, no abdominal pain, no headaches and no shortness of breath. Associated symptoms comments: Bilateral ear pain - congestion and fullness in ears   No injury- no fever. Nothing aggravates the symptoms. Nothing relieves the symptoms. He has tried nothing for the symptoms. Past Medical History:   Diagnosis Date    Depressive disorder     Hypothyroid     Schizoaffective disorder ([facility]     Uses hearing aid         History reviewed. No pertinent surgical history. History reviewed. No pertinent family history. Social History     Socioeconomic History    Marital status: SINGLE     Spouse name: Not on file    Number of children: Not on file    Years of education: Not on file    Highest education level: Not on file   Occupational History    Not on file   Tobacco Use    Smoking status: Every Day     Packs/day: 0.25     Types: Cigarettes    Smokeless tobacco: Current   Vaping Use    Vaping Use: Never used   Substance and Sexual Activity    Alcohol use: No    Drug use: No    Sexual activity: Never   Other Topics Concern    Not on file   Social History Narrative    Not on file     Social Determinants of Health     Financial Resource Strain: Not on file   Food Insecurity: Not on file   Transportation Needs: Not on file   Physical Activity: Not on file   Stress: Not on file   Social Connections: Not on file   Intimate Partner Violence: Not on file   Housing Stability: Not on file                ALLERGIES: Patient has no known allergies. Review of Systems   HENT:  Positive for congestion and ear pain. Respiratory:  Negative for shortness of breath. Cardiovascular:  Negative for chest pain. Gastrointestinal:  Negative for abdominal pain. Neurological:  Negative for headaches.    All other systems reviewed and are negative. Vitals:    10/07/22 1034 10/07/22 1036   BP:  108/62   Pulse: 97    Resp: 17    Temp: 98.6 °F (37 °C)    SpO2: 98%    Weight: 154 lb (69.9 kg)        Physical Exam  Vitals and nursing note reviewed. Constitutional:       General: He is not in acute distress. HENT:      Right Ear: Ear canal normal. A middle ear effusion is present. Tympanic membrane is erythematous and bulging. Left Ear: Tympanic membrane and ear canal normal. Drainage and swelling (of canal ??) present. Impacted cerumen: excessive ear wax. Nose: Nose normal.      Mouth/Throat:      Pharynx: No oropharyngeal exudate or posterior oropharyngeal erythema. Eyes:      General:         Right eye: No discharge. Left eye: No discharge. Conjunctiva/sclera: Conjunctivae normal.   Pulmonary:      Effort: Pulmonary effort is normal. No respiratory distress. Breath sounds: Normal breath sounds. No wheezing, rhonchi or rales. Musculoskeletal:      Cervical back: Neck supple. Lymphadenopathy:      Cervical: No cervical adenopathy. Skin:     Findings: No rash. MDM    Procedures      ICD-10-CM ICD-9-CM    1. Non-recurrent acute serous otitis media of right ear  H65.01 381.01       2. Other infective acute otitis externa of left ear  H60.392 380.10         Medications Ordered Today   Medications    amoxicillin (AMOXIL) 875 mg tablet     Sig: Take 1 Tablet by mouth two (2) times a day for 10 days. Dispense:  20 Tablet     Refill:  0    neomycin-polymyxin-hydrocortisone, buffered, (PEDIOTIC) 3.5-10,000-1 mg/mL-unit/mL-% otic suspension     Sig: Administer 3 Drops into each ear four (4) times daily. Dispense:  5 mL     Refill:  0     No results found for any visits on 10/07/22. The patients condition was discussed with the patient and they understand. The patient is to follow up with primary care doctor. If signs and symptoms become worse the pt is to go to the ER.  The patient is to take medications as prescribed.

## 2022-10-12 ENCOUNTER — OFFICE VISIT (OUTPATIENT)
Dept: PRIMARY CARE CLINIC | Age: 42
End: 2022-10-12
Payer: MEDICAID

## 2022-10-12 VITALS
WEIGHT: 155 LBS | DIASTOLIC BLOOD PRESSURE: 70 MMHG | HEART RATE: 76 BPM | OXYGEN SATURATION: 97 % | SYSTOLIC BLOOD PRESSURE: 110 MMHG | TEMPERATURE: 97.1 F | HEIGHT: 69 IN | RESPIRATION RATE: 16 BRPM | BODY MASS INDEX: 22.96 KG/M2

## 2022-10-12 DIAGNOSIS — J30.89 ENVIRONMENTAL AND SEASONAL ALLERGIES: ICD-10-CM

## 2022-10-12 DIAGNOSIS — K59.04 CHRONIC IDIOPATHIC CONSTIPATION: ICD-10-CM

## 2022-10-12 DIAGNOSIS — R06.2 WHEEZING: ICD-10-CM

## 2022-10-12 DIAGNOSIS — Z86.69 HISTORY OF OTITIS MEDIA: ICD-10-CM

## 2022-10-12 DIAGNOSIS — F17.200 CURRENT SMOKER: ICD-10-CM

## 2022-10-12 DIAGNOSIS — Z23 ENCOUNTER FOR IMMUNIZATION: ICD-10-CM

## 2022-10-12 DIAGNOSIS — E03.9 ACQUIRED HYPOTHYROIDISM: ICD-10-CM

## 2022-10-12 DIAGNOSIS — H92.01 RIGHT EAR PAIN: ICD-10-CM

## 2022-10-12 DIAGNOSIS — J45.50 SEVERE PERSISTENT ASTHMA WITHOUT COMPLICATION: Primary | ICD-10-CM

## 2022-10-12 DIAGNOSIS — Z87.898 HISTORY OF BRADYCARDIA: ICD-10-CM

## 2022-10-12 PROCEDURE — 99214 OFFICE O/P EST MOD 30 MIN: CPT | Performed by: NURSE PRACTITIONER

## 2022-10-12 PROCEDURE — 90686 IIV4 VACC NO PRSV 0.5 ML IM: CPT | Performed by: NURSE PRACTITIONER

## 2022-10-12 RX ORDER — IPRATROPIUM BROMIDE AND ALBUTEROL SULFATE 2.5; .5 MG/3ML; MG/3ML
3 SOLUTION RESPIRATORY (INHALATION)
Qty: 4 EACH | Refills: 2 | Status: SHIPPED | OUTPATIENT
Start: 2022-10-12

## 2022-10-12 RX ORDER — NEBULIZER AND COMPRESSOR
1 EACH MISCELLANEOUS
Qty: 1 EACH | Refills: 0 | Status: SHIPPED | OUTPATIENT
Start: 2022-10-12 | End: 2022-10-31 | Stop reason: SDUPTHER

## 2022-10-12 RX ORDER — FLUTICASONE PROPIONATE AND SALMETEROL 500; 50 UG/1; UG/1
1 POWDER RESPIRATORY (INHALATION) EVERY 12 HOURS
Qty: 60 EACH | Refills: 1 | Status: SHIPPED | OUTPATIENT
Start: 2022-10-12

## 2022-10-12 NOTE — PROGRESS NOTES
[facility]   Sjeffrey Coffeypeter .Joe, [address] [facility]  P: [phone]  F: [phone]    SUBJECTIVE     HPI:     Laury Homans is a 38 y.o. male who is seen in the clinic for   Chief Complaint   Patient presents with    Follow-up      The patient presents today for management of his co-morbidities. Asthma: has worsened recently. Per caregiver, he \"seems to be out of breath when he moves more\". Currently on Advair, Singulair, and prn Albuterol. Using prn Albuterol 1-2 times per day. Would like a referral to Pulmonology. Does not want to quit smoking. Allergies: well managed with Singulair and prn Flonase. Hypothyroidism: TSH levels were normal on 10/4. Currently on Synthroid 100 mcg once daily. Constipation: well managed with once daily docusate. He has since stopped taking Propanolol. HR is 76 today. On 10/7, the patient was seen by Dr. Chema Colon (ENT) and was dx with Bilateral Otitis Media. He was then rx'd Amoxicillin and Pediotic. However, the patient is having difficulty keeping the ear drop solution in his ear. Is still having mild right ear pain as a result. The patient is a [facility] [employer] and is accompanied by a caregiver. The caregiver is speaking on behalf of the patient due to the patient having mild ID. Patient Active Problem List    Diagnosis    Depressive disorder    Mild intellectual disability    Schizoaffective disorder ([address]    Acute left-sided low back pain without sciatica    Acquired hypothyroidism    Acne    Nystagmus        Past Medical History:   Diagnosis Date    Depressive disorder     Hypothyroid     Schizoaffective disorder ([address]     Uses hearing aid      History reviewed. No pertinent surgical history.   Social History     Socioeconomic History    Marital status: SINGLE     Spouse name: Not on file    Number of children: Not on file    Years of education: Not on file    Highest education level: Not on file Occupational History    Not on file   Tobacco Use    Smoking status: Every Day     Packs/day: 0.25     Types: Cigarettes    Smokeless tobacco: Current   Vaping Use    Vaping Use: Never used   Substance and Sexual Activity    Alcohol use: No    Drug use: No    Sexual activity: Never   Other Topics Concern    Not on file   Social History Narrative    Not on file     Social Determinants of Health     Financial Resource Strain: Not on file   Food Insecurity: Not on file   Transportation Needs: Not on file   Physical Activity: Not on file   Stress: Not on file   Social Connections: Not on file   Intimate Partner Violence: Not on file   Housing Stability: Not on file     History reviewed. No pertinent family history.   Immunization History   Administered Date(s) Administered    COVID-19, MODERNA BLUE border, Primary or Immunocompromised, (age 18y+), IM, 100 mcg/0.5mL 01/22/2021, 03/02/2021    COVID-19, MODERNA Booster BLUE border, (age 18y+), IM, 50mcg/0.25mL 11/18/2021    Influenza, FLUARIX, FLULAVAL, FLUZONE (age 10 mo+) AND AFLURIA, (age 1 y+), PF, 0.5mL 11/13/2019, 10/13/2020, 10/18/2021    Tdap 06/08/2016      No Known Allergies    Office Visit on 09/14/2022   Component Date Value Ref Range Status    Glucose 10/04/2022 82  70 - 99 mg/dL Final                  **Please note reference interval change**    BUN 10/04/2022 9  6 - 24 mg/dL Final    Creatinine 10/04/2022 0.68 (A)  0.76 - 1.27 mg/dL Final    eGFR 10/04/2022 121  >59 mL/min/1.73 Final    BUN/Creatinine ratio 10/04/2022 13  9 - 20 Final    Sodium 10/04/2022 136  134 - 144 mmol/L Final    Potassium 10/04/2022 4.5  3.5 - 5.2 mmol/L Final    Chloride 10/04/2022 99  96 - 106 mmol/L Final    CO2 10/04/2022 20  20 - 29 mmol/L Final    Calcium 10/04/2022 9.7  8.7 - 10.2 mg/dL Final    Protein, total 10/04/2022 7.1  6.0 - 8.5 g/dL Final    Albumin 10/04/2022 4.5  4.0 - 5.0 g/dL Final    GLOBULIN, TOTAL 10/04/2022 2.6  1.5 - 4.5 g/dL Final    A-G Ratio 10/04/2022 1.7 1.2 - 2.2 Final    Bilirubin, total 10/04/2022 0.2  0.0 - 1.2 mg/dL Final    Alk. phosphatase 10/04/2022 107  44 - 121 IU/L Final    AST (SGOT) 10/04/2022 17  0 - 40 IU/L Final    ALT (SGPT) 10/04/2022 12  0 - 44 IU/L Final    TSH 10/04/2022 1.300  0.450 - 4.500 uIU/mL Final    Comment: No apparent thyroid disorder. Additional testing not indicated. In  rare instances, Secondary Hypothyroidism as well as Subclinical  Hypothyroidism have been reported in some patients with normal TSH  values. Office Visit on 07/14/2022   Component Date Value Ref Range Status    WBC 07/14/2022 8.7  3.4 - 10.8 x10E3/uL Final    RBC 07/14/2022 5.64  4.14 - 5.80 x10E6/uL Final    HGB 07/14/2022 16.7  13.0 - 17.7 g/dL Final    HCT 07/14/2022 50.3  37.5 - 51.0 % Final    MCV 07/14/2022 89  79 - 97 fL Final    MCH 07/14/2022 29.6  26.6 - 33.0 pg Final    MCHC 07/14/2022 33.2  31.5 - 35.7 g/dL Final    RDW 07/14/2022 13.3  11.6 - 15.4 % Final    PLATELET 29/40/8554 163  150 - 450 x10E3/uL Final    NEUTROPHILS 07/14/2022 70  Not Estab. % Final    Lymphocytes 07/14/2022 19  Not Estab. % Final    MONOCYTES 07/14/2022 7  Not Estab. % Final    EOSINOPHILS 07/14/2022 3  Not Estab. % Final    BASOPHILS 07/14/2022 1  Not Estab. % Final    ABS. NEUTROPHILS 07/14/2022 6.2  1.4 - 7.0 x10E3/uL Final    Abs Lymphocytes 07/14/2022 1.6  0.7 - 3.1 x10E3/uL Final    ABS. MONOCYTES 07/14/2022 0.6  0.1 - 0.9 x10E3/uL Final    ABS. EOSINOPHILS 07/14/2022 0.2  0.0 - 0.4 x10E3/uL Final    ABS. BASOPHILS 07/14/2022 0.1  0.0 - 0.2 x10E3/uL Final    IMMATURE GRANULOCYTES 07/14/2022 0  Not Estab. % Final    ABS. IMM.  GRANS. 07/14/2022 0.0  0.0 - 0.1 x10E3/uL Final    Glucose 07/14/2022 78  65 - 99 mg/dL Final    BUN 07/14/2022 16  6 - 24 mg/dL Final    Creatinine 07/14/2022 0.80  0.76 - 1.27 mg/dL Final    eGFR 07/14/2022 115  >59 mL/min/1.73 Final    BUN/Creatinine ratio 07/14/2022 20  9 - 20 Final    Sodium 07/14/2022 139  134 - 144 mmol/L Final    Potassium 07/14/2022 4.9  3.5 - 5.2 mmol/L Final    Chloride 07/14/2022 103  96 - 106 mmol/L Final    CO2 07/14/2022 23  20 - 29 mmol/L Final    Calcium 07/14/2022 10.0  8.7 - 10.2 mg/dL Final    Protein, total 07/14/2022 7.4  6.0 - 8.5 g/dL Final    Albumin 07/14/2022 4.9  4.0 - 5.0 g/dL Final    GLOBULIN, TOTAL 07/14/2022 2.5  1.5 - 4.5 g/dL Final    A-G Ratio 07/14/2022 2.0  1.2 - 2.2 Final    Bilirubin, total 07/14/2022 0.4  0.0 - 1.2 mg/dL Final    Alk. phosphatase 07/14/2022 95  44 - 121 IU/L Final    AST (SGOT) 07/14/2022 17  0 - 40 IU/L Final    ALT (SGPT) 07/14/2022 15  0 - 44 IU/L Final    Cholesterol, total 07/14/2022 193  100 - 199 mg/dL Final    Triglyceride 07/14/2022 82  0 - 149 mg/dL Final    HDL Cholesterol 07/14/2022 52  >39 mg/dL Final    VLDL, calculated 07/14/2022 15  5 - 40 mg/dL Final    LDL, calculated 07/14/2022 126 (A)  0 - 99 mg/dL Final    Hemoglobin A1c 07/14/2022 5.3  4.8 - 5.6 % Final    Comment:          Prediabetes: 5.7 - 6.4           Diabetes: >6.4           Glycemic control for adults with diabetes: <7.0      Estimated average glucose 07/14/2022 105  mg/dL Final    TSH 07/14/2022 1.160  0.450 - 4.500 uIU/mL Final    T4, Free 07/14/2022 1.14  0.82 - 1.77 ng/dL Final    VITAMIN D, 25-HYDROXY 07/14/2022 42.7  30.0 - 100.0 ng/mL Final    Comment: Vitamin D deficiency has been defined by the [place] of  [employer]icine and an Endocrine Society practice guideline as a  level of serum 25-OH vitamin D less than 20 ng/mL (1,2). The Endocrine Society went on to further define vitamin D  insufficiency as a level between 21 and 29 ng/mL (2). 1. IOM ([place] of [employer]). 2010. Dietary reference     intakes for calcium and D. [facility]: The     [place]. 2. Tania MF, Marin VAZQUEZ, Carmen KLEIN, et al.     Evaluation, treatment, and prevention of vitamin D     deficiency: an Endocrine Society clinical practice     guideline. JCEM. 2011 Jul; 96(7):1911-30.         XR CHEST PA LAT  Narrative: Exam:  2 view chest    Indication: Mild persistent asthma. COMPARISON: 12/27/2016    PA and lateral views demonstrate normal heart size. There is no acute process in  the lung fields. The overall aeration has improved. The osseous structures are  unremarkable. Impression: Impression: No acute process. No pneumonia     Current Outpatient Medications   Medication Sig Dispense Refill    fluticasone propion-salmeteroL (ADVAIR/WIXELA) 500-50 mcg/dose diskus inhaler Take 1 Puff by inhalation every twelve (12) hours. 60 Each 1    albuterol-ipratropium (DUO-NEB) 2.5 mg-0.5 mg/3 ml nebu 3 mL by Nebulization route every four (4) hours as needed for Wheezing. 4 Each 2    albuterol sulfate (PROAIR RESPICLICK) 90 mcg/actuation breath activated inhaler Take 2 Puffs by inhalation every four (4) hours as needed for Wheezing or Shortness of Breath. 1 Each 2    Nebulizer & Compressor machine 1 Each by Does Not Apply route every four (4) hours as needed for Wheezing, Shortness of Breath or Cough. 1 Each 0    amoxicillin (AMOXIL) 875 mg tablet Take 1 Tablet by mouth two (2) times a day for 10 days. 20 Tablet 0    neomycin-polymyxin-hydrocortisone, buffered, (PEDIOTIC) 3.5-10,000-1 mg/mL-unit/mL-% otic suspension Administer 3 Drops into each ear four (4) times daily. 5 mL 0    levothyroxine (SYNTHROID) 100 mcg tablet Take 1 Tablet by mouth Daily (before breakfast). 90 Tablet 0    montelukast (SINGULAIR) 10 mg tablet Take 1 Tablet by mouth in the morning. 90 Tablet 3    fluticasone propionate (FLONASE) 50 mcg/actuation nasal spray BLOW NOSE: THEN USE 2 SPRAYS IN EACH NOSTRIL 2 TIMES A DAY AS NEEDED FOR CONGESTION 16 g 2    docusate sodium (DOK) 100 mg capsule 1 CAPSULE BY MOUTH EVERY DAY 90 Capsule 3    cholecalciferol, vitamin D3, 50 mcg (2,000 unit) tab Take 1 Tablet by mouth daily.  90 Tablet 3    acetaminophen (TYLENOL) 500 mg tablet TAKE 1 TABLET BY MOUTH EVERY 6 HOURS AS NEEDED FOR PAIN OR FEVER 30 Tablet 2 sunscreen 15 SPF lotion Use it as needed. 1 Bottle 1    gabapentin (NEURONTIN) 400 mg capsule Take 800 mg by mouth two (2) times a day. lithium carbonate (LITHOBID) 150 mg capsule Take 450 mg by mouth every morning. lithium carbonate (LITHONATE) 300 mg capsule Take 600 mg by mouth nightly. morning       OLANZapine (ZYPREXA) 15 mg tablet Take 30 mg by mouth nightly. QUEtiapine (SEROQUEL) 400 mg tablet Take 400 mg by mouth two (2) times a day. sertraline (ZOLOFT) 50 mg tablet Take 50 mg by mouth nightly. The past medical history, past surgical history, and family history were reviewed and updated in the medical record. Lab values/Imaging were reviewed. The medications were reviewed and updated in the medical record. Immunizations were reviewed and updated in the medical record. All relevant preventative screenings reviewed and updated in the medical record. REVIEW OF SYSTEMS   Review of Systems   HENT:  Positive for ear pain. Negative for congestion, ear discharge, hearing loss, sinus pain, sore throat and tinnitus. Respiratory:  Positive for shortness of breath and wheezing. Negative for cough, hemoptysis and sputum production. Cardiovascular:  Negative for chest pain and palpitations. Gastrointestinal:  Negative for constipation and nausea. Neurological:  Negative for dizziness and headaches. Endo/Heme/Allergies:  Negative for environmental allergies. PHYSICAL EXAM   /70 (BP 1 Location: Left upper arm, BP Patient Position: Sitting, BP Cuff Size: Adult)   Pulse 76   Temp 97.1 °F (36.2 °C) (Temporal)   Resp 16   Ht 5' 9\" (1.753 m)   Wt 155 lb (70.3 kg)   SpO2 97%   BMI 22.89 kg/m²      Physical Exam  Constitutional:       General: He is not in acute distress. Appearance: He is not ill-appearing or diaphoretic. HENT:      Nose: No congestion or rhinorrhea.       Mouth/Throat:      Mouth: Mucous membranes are moist.      Pharynx: Oropharynx is clear. No oropharyngeal exudate or posterior oropharyngeal erythema. Eyes:      Extraocular Movements: Extraocular movements intact. Pupils: Pupils are equal, round, and reactive to light. Cardiovascular:      Rate and Rhythm: Normal rate and regular rhythm. Pulses: Normal pulses. Heart sounds: Normal heart sounds. Pulmonary:      Effort: Pulmonary effort is normal. No respiratory distress. Breath sounds: Wheezing present. Chest:      Chest wall: No tenderness. Musculoskeletal:      Cervical back: Neck supple. No tenderness. Skin:     Capillary Refill: Capillary refill takes less than 2 seconds. Neurological:      Mental Status: He is alert. ASSESSMENT/ PLAN   Below is the assessment and plan developed based on review of pertinent history, physical exam, labs, studies, and medications. 1. Severe persistent asthma without complication  -     fluticasone propion-salmeteroL (ADVAIR/WIXELA) 500-50 mcg/dose diskus inhaler; Take 1 Puff by inhalation every twelve (12) hours. , Normal, Disp-60 Each, R-1Appointment needed for future refills. -     albuterol-ipratropium (DUO-NEB) 2.5 mg-0.5 mg/3 ml nebu; 3 mL by Nebulization route every four (4) hours as needed for Wheezing., Normal, Disp-4 Each, R-2Appointment needed for future refills. -     albuterol sulfate (PROAIR RESPICLICK) 90 mcg/actuation breath activated inhaler; Take 2 Puffs by inhalation every four (4) hours as needed for Wheezing or Shortness of Breath., Normal, Disp-1 Each, R-2Appointment needed for future refills.  -     REFERRAL TO PULMONARY DISEASE  -     Nebulizer & Compressor machine; 1 Each by Does Not Apply route every four (4) hours as needed for Wheezing, Shortness of Breath or Cough., Normal, Disp-1 Each, R-0  2.  Wheezing  -     albuterol-ipratropium (DUO-NEB) 2.5 mg-0.5 mg/3 ml nebu; 3 mL by Nebulization route every four (4) hours as needed for Wheezing., Normal, Disp-4 Each, R-2Appointment needed for future refills. -     albuterol sulfate (PROAIR RESPICLICK) 90 mcg/actuation breath activated inhaler; Take 2 Puffs by inhalation every four (4) hours as needed for Wheezing or Shortness of Breath., Normal, Disp-1 Each, R-2Appointment needed for future refills.  -     REFERRAL TO PULMONARY DISEASE  -     Nebulizer & Compressor machine; 1 Each by Does Not Apply route every four (4) hours as needed for Wheezing, Shortness of Breath or Cough., Normal, Disp-1 Each, R-0  -     XR CHEST PA LAT; Future  3. Environmental and seasonal allergies  Continue with current treatment regimen.   -     REFERRAL TO PULMONARY DISEASE  4. Acquired hypothyroidism  Continue with current treatment regimen. Will re-check TSH at next visit. 5. Chronic idiopathic constipation  Continue with current treatment regimen. 6. Right ear pain  Likely due to improper administration of Pediotic. Educated caregiver on how to administer solution to bilateral ears. Will rx Levaquin if symptoms continue after conclusion of Amoxicillin and Pediotic. 7. History of otitis media  Educated caregiver on how to administer solution to bilateral ears. Will rx Levaquin if symptoms continue after conclusion of Amoxicillin and Pediotic. 8. History of bradycardia  Has since resolved with d/c of Propanolol. 9. Current smoker  Educated patient on the importance of smoking cessation to improve his respiratory symptoms. 10. Encounter for immunization  -     INFLUENZA, FLUARIX, FLULAVAL, FLUZONE (AGE 6 MO+), AFLURIA(AGE 3Y+) IM, PF, 0.5 ML     Proper respiratory precautions discussed with the caregiver. Follow-up and Dispositions    Return in about 3 months (around 1/12/2023) for Management of co-morbidities. .           Disclaimer:  Advised patient to call back or return to office if symptoms worsen/change/persist.  Discussed expected course/resolution/complications of diagnosis in detail with patient.      Medication risks/benefits/alternatives discussed with patient. Patient was given an after visit summary which includes diagnoses, current medications, & vitals. Discussed patient instructions and advised to read to all patient instructions regarding care. Patient expressed understanding with the diagnosis and plan.        Indio Mcgee NP  10/12/2022

## 2022-10-20 ENCOUNTER — HOSPITAL ENCOUNTER (OUTPATIENT)
Dept: GENERAL RADIOLOGY | Age: 42
Discharge: HOME | End: 2022-10-20
Attending: NURSE PRACTITIONER
Payer: MEDICAID

## 2022-10-20 DIAGNOSIS — R06.2 WHEEZING: ICD-10-CM

## 2022-10-20 PROCEDURE — 71046 X-RAY EXAM CHEST 2 VIEWS: CPT

## 2022-10-31 DIAGNOSIS — R06.2 WHEEZING: ICD-10-CM

## 2022-10-31 DIAGNOSIS — J45.50 SEVERE PERSISTENT ASTHMA WITHOUT COMPLICATION: ICD-10-CM

## 2022-10-31 RX ORDER — NEBULIZER AND COMPRESSOR
1 EACH MISCELLANEOUS
Qty: 1 EACH | Refills: 0 | Status: SHIPPED | OUTPATIENT
Start: 2022-10-31 | End: 2022-11-29 | Stop reason: SDUPTHER

## 2022-10-31 NOTE — TELEPHONE ENCOUNTER
PCP: Karen Wing NP    Last appt: 10/12/2022  Future Appointments   Date Time Provider Jaymie Giraldo   2023  9:20 AM Karen Wing NP [facility]       Requested Prescriptions     Pending Prescriptions Disp Refills    Nebulizer & Compressor machine 1 Each 0     Si Each by Does Not Apply route every four (4) hours as needed for Wheezing, Shortness of Breath or Cough.          Other Comments: duplicate last refilled 2 weeks ago

## 2022-11-16 ENCOUNTER — OFFICE VISIT (OUTPATIENT)
Dept: PRIMARY CARE CLINIC | Age: 42
End: 2022-11-16
Payer: MEDICAID

## 2022-11-16 VITALS
TEMPERATURE: 98.6 F | HEIGHT: 69 IN | DIASTOLIC BLOOD PRESSURE: 73 MMHG | OXYGEN SATURATION: 96 % | WEIGHT: 154.6 LBS | HEART RATE: 70 BPM | RESPIRATION RATE: 18 BRPM | SYSTOLIC BLOOD PRESSURE: 120 MMHG | BODY MASS INDEX: 22.9 KG/M2

## 2022-11-16 DIAGNOSIS — F70 MILD INTELLECTUAL DISABILITY: ICD-10-CM

## 2022-11-16 DIAGNOSIS — H92.01 RIGHT EAR PAIN: ICD-10-CM

## 2022-11-16 DIAGNOSIS — J45.40 MODERATE PERSISTENT ASTHMA WITHOUT COMPLICATION: ICD-10-CM

## 2022-11-16 DIAGNOSIS — H65.91 MIDDLE EAR EFFUSION, RIGHT: Primary | ICD-10-CM

## 2022-11-16 DIAGNOSIS — Z72.0 TOBACCO ABUSE: ICD-10-CM

## 2022-11-16 PROCEDURE — 99213 OFFICE O/P EST LOW 20 MIN: CPT

## 2022-11-16 NOTE — PROGRESS NOTES
[facility]   [address], [address] [facility]  P: [phone]  F: [phone]    SUBJECTIVE     HPI:     Asad Wan is a 38 y.o. male who is seen in the clinic for   Chief Complaint   Patient presents with    Ear Pain     Patient states his ear has been hurting           Established patient, new to me, here with c/o left ear pain. His PCP is Irasema Arroyo NP. He is here today with Jairo Verma who is the supervisor of his group home. He c/o right ear pain. Tells me he has been putting his fingers in his ear and it hurts, feels swollen. He was seen at an Urgent Care in 10/7, diagnosed with acute otitis media, prescribed Amoxicillin and given Pediotic ear drops. He denies fever/malaise, no ear drainage, vertigo, dizziness, sinus pressure/pain, headaches. Hearing is the same, wears hearing aides in both ears. He has a history of ear surgery, had tubes placed in both ears as a child. He has a history of asthma and is a current smoker. Complaint with Advair and uses Albuterol PRN. He is not interested in smoking cessation today. He denies dyspnea, cough. Patient Active Problem List    Diagnosis    Depressive disorder    Mild intellectual disability    Schizoaffective disorder ([address]    Acute left-sided low back pain without sciatica    Acquired hypothyroidism    Acne    Nystagmus        Past Medical History:   Diagnosis Date    Depressive disorder     Hypothyroid     Schizoaffective disorder ([address]     Uses hearing aid      History reviewed. No pertinent surgical history.   Social History     Socioeconomic History    Marital status: SINGLE     Spouse name: Not on file    Number of children: Not on file    Years of education: Not on file    Highest education level: Not on file   Occupational History    Not on file   Tobacco Use    Smoking status: Every Day     Packs/day: 0.25     Types: Cigarettes    Smokeless tobacco: Current   Vaping Use    Vaping Use: Never used   Substance and Sexual Activity    Alcohol use: No    Drug use: No    Sexual activity: Never   Other Topics Concern    Not on file   Social History Narrative    Not on file     Social Determinants of Health     Financial Resource Strain: Not on file   Food Insecurity: Not on file   Transportation Needs: Not on file   Physical Activity: Not on file   Stress: Not on file   Social Connections: Not on file   Intimate Partner Violence: Not on file   Housing Stability: Not on file     History reviewed. No pertinent family history. Immunization History   Administered Date(s) Administered    COVID-19, MODERNA BLUE border, Primary or Immunocompromised, (age 18y+), IM, 100 mcg/0.5mL 01/22/2021, 03/02/2021    COVID-19, MODERNA Booster BLUE border, (age 18y+), IM, 50mcg/0.25mL 11/18/2021    Influenza, FLUARIX, FLULAVAL, FLUZONE (age 10 mo+) AND AFLURIA, (age 1 y+), PF, 0.5mL 11/13/2019, 10/13/2020, 10/18/2021, 10/12/2022    Tdap 06/08/2016      No Known Allergies    Office Visit on 09/14/2022   Component Date Value Ref Range Status    Glucose 10/04/2022 82  70 - 99 mg/dL Final                  **Please note reference interval change**    BUN 10/04/2022 9  6 - 24 mg/dL Final    Creatinine 10/04/2022 0.68 (A)  0.76 - 1.27 mg/dL Final    eGFR 10/04/2022 121  >59 mL/min/1.73 Final    BUN/Creatinine ratio 10/04/2022 13  9 - 20 Final    Sodium 10/04/2022 136  134 - 144 mmol/L Final    Potassium 10/04/2022 4.5  3.5 - 5.2 mmol/L Final    Chloride 10/04/2022 99  96 - 106 mmol/L Final    CO2 10/04/2022 20  20 - 29 mmol/L Final    Calcium 10/04/2022 9.7  8.7 - 10.2 mg/dL Final    Protein, total 10/04/2022 7.1  6.0 - 8.5 g/dL Final    Albumin 10/04/2022 4.5  4.0 - 5.0 g/dL Final    GLOBULIN, TOTAL 10/04/2022 2.6  1.5 - 4.5 g/dL Final    A-G Ratio 10/04/2022 1.7  1.2 - 2.2 Final    Bilirubin, total 10/04/2022 0.2  0.0 - 1.2 mg/dL Final    Alk.  phosphatase 10/04/2022 107  44 - 121 IU/L Final    AST (SGOT) 10/04/2022 17  0 - 40 IU/L Final    ALT (SGPT) 10/04/2022 12  0 - 44 IU/L Final    TSH 10/04/2022 1.300  0.450 - 4.500 uIU/mL Final    Comment: No apparent thyroid disorder. Additional testing not indicated. In  rare instances, Secondary Hypothyroidism as well as Subclinical  Hypothyroidism have been reported in some patients with normal TSH  values. XR CHEST PA LAT  Narrative: Clinical history: Cough  INDICATION:   Cough  COMPARISON: 2017    FINDINGS:   PA and lateral views of the chest are obtained. The cardiopericardial silhouette is within normal limits. There is no pleural  effusion, pneumothorax or focal consolidation present. Impression: No acute intrathoracic disease. Current Outpatient Medications   Medication Sig Dispense Refill    Nebulizer & Compressor machine 1 Each by Does Not Apply route every four (4) hours as needed for Wheezing, Shortness of Breath or Cough. 1 Each 0    fluticasone propion-salmeteroL (ADVAIR/WIXELA) 500-50 mcg/dose diskus inhaler Take 1 Puff by inhalation every twelve (12) hours. 60 Each 1    albuterol-ipratropium (DUO-NEB) 2.5 mg-0.5 mg/3 ml nebu 3 mL by Nebulization route every four (4) hours as needed for Wheezing. 4 Each 2    albuterol sulfate (PROAIR RESPICLICK) 90 mcg/actuation breath activated inhaler Take 2 Puffs by inhalation every four (4) hours as needed for Wheezing or Shortness of Breath. 1 Each 2    neomycin-polymyxin-hydrocortisone, buffered, (PEDIOTIC) 3.5-10,000-1 mg/mL-unit/mL-% otic suspension Administer 3 Drops into each ear four (4) times daily. 5 mL 0    levothyroxine (SYNTHROID) 100 mcg tablet Take 1 Tablet by mouth Daily (before breakfast). 90 Tablet 0    montelukast (SINGULAIR) 10 mg tablet Take 1 Tablet by mouth in the morning.  90 Tablet 3    fluticasone propionate (FLONASE) 50 mcg/actuation nasal spray BLOW NOSE: THEN USE 2 SPRAYS IN EACH NOSTRIL 2 TIMES A DAY AS NEEDED FOR CONGESTION 16 g 2    docusate sodium (DOK) 100 mg capsule 1 CAPSULE BY MOUTH EVERY DAY 90 Capsule 3 cholecalciferol, vitamin D3, 50 mcg (2,000 unit) tab Take 1 Tablet by mouth daily. 90 Tablet 3    acetaminophen (TYLENOL) 500 mg tablet TAKE 1 TABLET BY MOUTH EVERY 6 HOURS AS NEEDED FOR PAIN OR FEVER 30 Tablet 2    sunscreen 15 SPF lotion Use it as needed. 1 Bottle 1    gabapentin (NEURONTIN) 400 mg capsule Take 800 mg by mouth two (2) times a day. lithium carbonate (LITHOBID) 150 mg capsule Take 450 mg by mouth every morning. lithium carbonate (LITHONATE) 300 mg capsule Take 600 mg by mouth nightly. morning       OLANZapine (ZYPREXA) 15 mg tablet Take 30 mg by mouth nightly. QUEtiapine (SEROQUEL) 400 mg tablet Take 400 mg by mouth two (2) times a day. sertraline (ZOLOFT) 50 mg tablet Take 50 mg by mouth nightly. The past medical history, past surgical history, and family history were reviewed and updated in the medical record. Lab values/Imaging were reviewed. The medications were reviewed and updated in the medical record. Immunizations were reviewed and updated in the medical record. All relevant preventative screenings reviewed and updated in the medical record. REVIEW OF SYSTEMS   Review of Systems   Constitutional:  Negative for chills, diaphoresis, fever, malaise/fatigue and weight loss. HENT:  Positive for ear pain. Negative for congestion, ear discharge, hearing loss, sinus pain and sore throat. Respiratory:  Negative for cough and shortness of breath. Cardiovascular:  Negative for chest pain. Neurological:  Negative for dizziness and headaches. PHYSICAL EXAM   /73 (BP 1 Location: Left upper arm, BP Patient Position: Sitting, BP Cuff Size: Adult)   Pulse 70   Temp 98.6 °F (37 °C) (Temporal)   Resp 18   Ht 5' 9\" (1.753 m)   Wt 154 lb 9.6 oz (70.1 kg)   SpO2 96%   BMI 22.83 kg/m²      Physical Exam  Constitutional:       General: He is not in acute distress. Appearance: He is normal weight. He is not toxic-appearing.    HENT:      Right Ear: Hearing and external ear normal. No decreased hearing noted. Swelling and tenderness present. No drainage. A middle ear effusion is present. There is no impacted cerumen. No foreign body. Left Ear: Hearing, tympanic membrane and external ear normal. No decreased hearing noted. No drainage, swelling or tenderness. No middle ear effusion. There is no impacted cerumen. No foreign body. Cardiovascular:      Rate and Rhythm: Normal rate. Pulses: Normal pulses. Neurological:      Mental Status: He is alert. Mental status is at baseline. Cranial Nerves: No cranial nerve deficit. Psychiatric:         Mood and Affect: Mood normal.         Behavior: Behavior normal.         Thought Content: Thought content normal.         Judgment: Judgment normal.          ASSESSMENT/ PLAN   Below is the assessment and plan developed based on review of pertinent history, physical exam, labs, studies, and medications. 1. Middle ear effusion, right  -  Completed course of Amoxicillin, Pediotic drops about 1 month ago. TM intact with clear fluid. Small amount of rickey cerumen bilaterally. Patient has pain when he puts his finger in his ear. - Advised to continue Flonase, can try OTC sinus medication such as Sudafed. - Reassured that effusions can take time to drain and ear does not appear infected. - If otalgia does not gradually improve/worsens, return to clinic. 2. Right ear pain  -  Completed course of Amoxicillin, Pediotic drops about 1 month ago. TM intact with clear fluid. Small amount of rickey cerumen bilaterally. Patient has pain when he puts his finger in his ear. - Advised to continue Flonase, can try OTC sinus medication such as Sudafed. - Reassured that effusions can take time to drain and ear does not appear infected. - If otalgia does not gradually improve, return to clinic. 3. Moderate persistent asthma without complication  - Continue Advair daily, albuterol as needed.   -  Referred to Pulmonology at previous appointment with PCP. Per aide he is scheduled with Pulmonologist.  4. Tobacco abuse   - Counseled that smoking is negatively affecting all aspects of health. No interest in smoking cessation. 5. Mild intellectual disability  - HPI obtained from patient and aide. Disclaimer:  Advised patient to call back or return to office if symptoms worsen/change/persist.  Discussed expected course/resolution/complications of diagnosis in detail with patient. Medication risks/benefits/alternatives discussed with patient. Patient was given an after visit summary which includes diagnoses, current medications, & vitals. Discussed patient instructions and advised to read to all patient instructions regarding care. Patient expressed understanding with the diagnosis and plan.        Tien Wilson NP  11/16/2022

## 2022-11-16 NOTE — PROGRESS NOTES
Identified pt with two pt identifiers(name and ). Chief Complaint   Patient presents with    Ear Pain     Patient states his ear has been hurting         3 most recent PHQ Screens 2022   PHQ Not Done -   Little interest or pleasure in doing things Not at all   Feeling down, depressed, irritable, or hopeless Not at all   Total Score PHQ 2 0        Vitals:    22 1021   BP: 120/73   Pulse: 70   Resp: 18   Temp: 98.6 °F (37 °C)   TempSrc: Temporal   SpO2: 96%   Weight: 154 lb 9.6 oz (70.1 kg)   Height: 5' 9\" (1.753 m)   PainSc:   4   PainLoc: Ear       Health Maintenance Due   Topic Date Due    Depression Monitoring  Never done    COVID-19 Vaccine (4 - Booster for Moderna series) 2022        1. Have you been to the ER, urgent care clinic since your last visit? Hospitalized since your last visit? No    2. Have you seen or consulted any other health care providers outside of the [address] [place] [address] since your last visit? Include any pap smears or colon screening. No    3. For patients aged 39-70: Has the patient had a colonoscopy / FIT/ Cologuard?  NA - based on age      I

## 2022-11-29 DIAGNOSIS — R06.2 WHEEZING: ICD-10-CM

## 2022-11-29 DIAGNOSIS — J45.50 SEVERE PERSISTENT ASTHMA WITHOUT COMPLICATION: ICD-10-CM

## 2022-11-29 RX ORDER — FLUTICASONE PROPIONATE AND SALMETEROL 500; 50 UG/1; UG/1
1 POWDER RESPIRATORY (INHALATION) EVERY 12 HOURS
Qty: 60 EACH | Refills: 2 | Status: SHIPPED | OUTPATIENT
Start: 2022-11-29 | End: 2023-01-04 | Stop reason: SDUPTHER

## 2022-11-29 RX ORDER — NEBULIZER AND COMPRESSOR
1 EACH MISCELLANEOUS
Qty: 1 EACH | Refills: 0 | Status: SHIPPED | OUTPATIENT
Start: 2022-11-29 | End: 2022-12-29 | Stop reason: SDUPTHER

## 2022-11-29 NOTE — TELEPHONE ENCOUNTER
Pharmacy faxed a request.    PCP: Ruben Reynoso NP    Last appt: 2022  Future Appointments   Date Time Provider Jaymie Giraldo   2023  9:20 AM Ruben Reynoso NP [facility]       Requested Prescriptions     Pending Prescriptions Disp Refills    fluticasone propion-salmeteroL (ADVAIR/WIXELA) 500-50 mcg/dose diskus inhaler 60 Each 1     Sig: Take 1 Puff by inhalation every twelve (12) hours. Nebulizer & Compressor machine 1 Each 0     Si Each by Does Not Apply route every four (4) hours as needed for Wheezing, Shortness of Breath or Cough.        Prior labs and Blood pressures:  BP Readings from Last 3 Encounters:   22 120/73   10/12/22 110/70   10/07/22 108/62     Lab Results   Component Value Date/Time    Sodium 136 10/04/2022 01:37 PM    Potassium 4.5 10/04/2022 01:37 PM    Chloride 99 10/04/2022 01:37 PM    CO2 20 10/04/2022 01:37 PM    Anion gap 4 (L) 10/16/2019 02:24 PM    Glucose 82 10/04/2022 01:37 PM    BUN 9 10/04/2022 01:37 PM    Creatinine 0.68 (L) 10/04/2022 01:37 PM    BUN/Creatinine ratio 13 10/04/2022 01:37 PM    GFR est  10/18/2021 12:00 AM    GFR est non- 10/18/2021 12:00 AM    Calcium 9.7 10/04/2022 01:37 PM

## 2022-11-29 NOTE — TELEPHONE ENCOUNTER
PCP: Nolan Agarwal NP    Last appt: 2022  Future Appointments   Date Time Provider Jaymie Giraldo   2023  9:20 AM Nolan Agarwal NP [facility]       Requested Prescriptions     Pending Prescriptions Disp Refills    fluticasone propion-salmeteroL (ADVAIR/WIXELA) 500-50 mcg/dose diskus inhaler 60 Each 1     Sig: Take 1 Puff by inhalation every twelve (12) hours. Nebulizer & Compressor machine 1 Each 0     Si Each by Does Not Apply route every four (4) hours as needed for Wheezing, Shortness of Breath or Cough.          Other Comments: last refill   10/12/22

## 2022-12-29 DIAGNOSIS — J45.50 SEVERE PERSISTENT ASTHMA WITHOUT COMPLICATION: ICD-10-CM

## 2022-12-29 DIAGNOSIS — R06.2 WHEEZING: ICD-10-CM

## 2022-12-29 DIAGNOSIS — E03.9 ACQUIRED HYPOTHYROIDISM: ICD-10-CM

## 2022-12-29 NOTE — TELEPHONE ENCOUNTER
Refill request by fax for Levothyroxine 100 mcg and Nebulizer. Patient has an appt set with Raul Rolling on 1/4/23.

## 2023-01-03 RX ORDER — LEVOTHYROXINE SODIUM 100 UG/1
100 TABLET ORAL
Qty: 90 TABLET | Refills: 0 | Status: SHIPPED | OUTPATIENT
Start: 2023-01-03 | End: 2023-01-04 | Stop reason: SDUPTHER

## 2023-01-03 RX ORDER — NEBULIZER AND COMPRESSOR
1 EACH MISCELLANEOUS
Qty: 1 EACH | Refills: 0 | Status: SHIPPED | OUTPATIENT
Start: 2023-01-03 | End: 2023-01-06 | Stop reason: SDUPTHER

## 2023-01-03 NOTE — TELEPHONE ENCOUNTER
PCP: Nicholas Garcia NP    Last appt: 2022  Future Appointments   Date Time Provider Jaymie Giraldo   2023  9:20 AM Nicholas Garcia NP [facility]       Requested Prescriptions     Pending Prescriptions Disp Refills    levothyroxine (SYNTHROID) 100 mcg tablet 90 Tablet 0     Sig: Take 1 Tablet by mouth Daily (before breakfast). Nebulizer & Compressor machine 1 Each 0     Si Each by Does Not Apply route every four (4) hours as needed for Wheezing, Shortness of Breath or Cough.          Other Comments:

## 2023-01-04 ENCOUNTER — OFFICE VISIT (OUTPATIENT)
Dept: PRIMARY CARE CLINIC | Age: 43
End: 2023-01-04
Payer: MEDICAID

## 2023-01-04 VITALS
RESPIRATION RATE: 12 BRPM | HEART RATE: 75 BPM | BODY MASS INDEX: 23.34 KG/M2 | WEIGHT: 157.6 LBS | DIASTOLIC BLOOD PRESSURE: 75 MMHG | HEIGHT: 69 IN | OXYGEN SATURATION: 98 % | SYSTOLIC BLOOD PRESSURE: 116 MMHG | TEMPERATURE: 97.3 F

## 2023-01-04 DIAGNOSIS — E55.9 VITAMIN D DEFICIENCY DISEASE: ICD-10-CM

## 2023-01-04 DIAGNOSIS — Z71.6 ENCOUNTER FOR SMOKING CESSATION COUNSELING: ICD-10-CM

## 2023-01-04 DIAGNOSIS — E03.9 ACQUIRED HYPOTHYROIDISM: Primary | ICD-10-CM

## 2023-01-04 DIAGNOSIS — Z79.899 MEDICATION MANAGEMENT: ICD-10-CM

## 2023-01-04 DIAGNOSIS — Z86.69 HISTORY OF EAR PAIN: ICD-10-CM

## 2023-01-04 DIAGNOSIS — J30.89 ENVIRONMENTAL AND SEASONAL ALLERGIES: ICD-10-CM

## 2023-01-04 DIAGNOSIS — J45.40 MODERATE PERSISTENT ASTHMA WITHOUT COMPLICATION: ICD-10-CM

## 2023-01-04 DIAGNOSIS — K59.04 CHRONIC IDIOPATHIC CONSTIPATION: ICD-10-CM

## 2023-01-04 DIAGNOSIS — F17.200 CURRENT SMOKER: ICD-10-CM

## 2023-01-04 PROCEDURE — 99214 OFFICE O/P EST MOD 30 MIN: CPT | Performed by: NURSE PRACTITIONER

## 2023-01-04 RX ORDER — IBUPROFEN 200 MG
1 TABLET ORAL EVERY 24 HOURS
Qty: 30 PATCH | Refills: 0 | Status: SHIPPED | OUTPATIENT
Start: 2023-01-04 | End: 2023-02-03

## 2023-01-04 RX ORDER — IPRATROPIUM BROMIDE AND ALBUTEROL SULFATE 2.5; .5 MG/3ML; MG/3ML
3 SOLUTION RESPIRATORY (INHALATION)
Qty: 12 EACH | Refills: 0 | Status: SHIPPED | OUTPATIENT
Start: 2023-01-04 | End: 2023-01-04

## 2023-01-04 RX ORDER — FLUTICASONE PROPIONATE AND SALMETEROL 500; 50 UG/1; UG/1
1 POWDER RESPIRATORY (INHALATION) EVERY 12 HOURS
Qty: 180 EACH | Refills: 0 | Status: SHIPPED | OUTPATIENT
Start: 2023-01-04

## 2023-01-04 RX ORDER — LEVOTHYROXINE SODIUM 100 UG/1
100 TABLET ORAL
Qty: 90 TABLET | Refills: 0 | Status: SHIPPED | OUTPATIENT
Start: 2023-01-04

## 2023-01-04 RX ORDER — ALBUTEROL SULFATE 0.83 MG/ML
2.5 SOLUTION RESPIRATORY (INHALATION)
Qty: 12 EACH | Refills: 0 | Status: SHIPPED | OUTPATIENT
Start: 2023-01-04

## 2023-01-04 NOTE — PROGRESS NOTES
[facility]   [address], [address] [facility]  P: [phone]  F: [phone]    SUBJECTIVE     HPI:     Sarbjit Richardson is a 38 y.o. male who is seen in the clinic for   Chief Complaint   Patient presents with    Follow-up    Medication Refill     Pt would like all medications refilled     Other     Would like to have a D/c'd order for catheter- caregiver states that he never uses it        The patient presents today to establish care and for management of his co-morbidities. Hx of Hypothyroidism. TSH was 1.3 in October. Currently rx'd Synthroid 100 mcg every day. Hx of Asthma/Allergies. Has begun to see an unknown Pulmonologist at [facility]. Medication regimen has not changed. Takes Advair and Singulair daily. Uses prn albuterol 1-2 times per week. Continues to smoker > 1 PPD. Would like to try Nicotine patches again. Hx of Constipation. Well managed with OTC stool softener. Hx of Vitamin D Deficiency. Level was 42 in July. Currently on supplementation. Previously complained ear pain. Symptoms have since resolved. The patient is a member of [employer] and is accompanied by a caregiver. The caregiver is speaking on behalf of the patient due to the patient having mild ID. Patient Active Problem List    Diagnosis    Depressive disorder    Mild intellectual disability    Schizoaffective disorder ([address]    Acute left-sided low back pain without sciatica    Acquired hypothyroidism    Acne    Nystagmus        Past Medical History:   Diagnosis Date    Depressive disorder     Hypothyroid     Schizoaffective disorder ([address]     Uses hearing aid      History reviewed. No pertinent surgical history.   Social History     Socioeconomic History    Marital status: SINGLE     Spouse name: Not on file    Number of children: Not on file    Years of education: Not on file    Highest education level: Not on file   Occupational History    Not on file   Tobacco Use    Smoking status: Every Day     Packs/day: 0.25     Types: Cigarettes    Smokeless tobacco: Current   Vaping Use    Vaping Use: Never used   Substance and Sexual Activity    Alcohol use: No    Drug use: No    Sexual activity: Never   Other Topics Concern    Not on file   Social History Narrative    Not on file     Social Determinants of Health     Financial Resource Strain: Not on file   Food Insecurity: Not on file   Transportation Needs: Not on file   Physical Activity: Not on file   Stress: Not on file   Social Connections: Not on file   Intimate Partner Violence: Not on file   Housing Stability: Not on file     History reviewed. No pertinent family history. Immunization History   Administered Date(s) Administered    COVID-19, MODERNA BLUE border, Primary or Immunocompromised, (age 18y+), IM, 100 mcg/0.5mL 01/22/2021, 03/02/2021    COVID-19, MODERNA Booster BLUE border, (age 18y+), IM, 50mcg/0.25mL 11/18/2021    Influenza, FLUARIX, FLULAVAL, FLUZONE (age 10 mo+) AND AFLURIA, (age 1 y+), PF, 0.5mL 11/13/2019, 10/13/2020, 10/18/2021, 10/12/2022    Tdap 06/08/2016      No Known Allergies    No visits with results within 3 Month(s) from this visit.    Latest known visit with results is:   Office Visit on 09/14/2022   Component Date Value Ref Range Status    Glucose 10/04/2022 82  70 - 99 mg/dL Final                  **Please note reference interval change**    BUN 10/04/2022 9  6 - 24 mg/dL Final    Creatinine 10/04/2022 0.68 (A)  0.76 - 1.27 mg/dL Final    eGFR 10/04/2022 121  >59 mL/min/1.73 Final    BUN/Creatinine ratio 10/04/2022 13  9 - 20 Final    Sodium 10/04/2022 136  134 - 144 mmol/L Final    Potassium 10/04/2022 4.5  3.5 - 5.2 mmol/L Final    Chloride 10/04/2022 99  96 - 106 mmol/L Final    CO2 10/04/2022 20  20 - 29 mmol/L Final    Calcium 10/04/2022 9.7  8.7 - 10.2 mg/dL Final    Protein, total 10/04/2022 7.1  6.0 - 8.5 g/dL Final    Albumin 10/04/2022 4.5  4.0 - 5.0 g/dL Final GLOBULIN, TOTAL 10/04/2022 2.6  1.5 - 4.5 g/dL Final    A-G Ratio 10/04/2022 1.7  1.2 - 2.2 Final    Bilirubin, total 10/04/2022 0.2  0.0 - 1.2 mg/dL Final    Alk. phosphatase 10/04/2022 107  44 - 121 IU/L Final    AST (SGOT) 10/04/2022 17  0 - 40 IU/L Final    ALT (SGPT) 10/04/2022 12  0 - 44 IU/L Final    TSH 10/04/2022 1.300  0.450 - 4.500 uIU/mL Final    Comment: No apparent thyroid disorder. Additional testing not indicated. In  rare instances, Secondary Hypothyroidism as well as Subclinical  Hypothyroidism have been reported in some patients with normal TSH  values. XR CHEST PA LAT  Narrative: Clinical history: Cough  INDICATION:   Cough  COMPARISON: 2017    FINDINGS:   PA and lateral views of the chest are obtained. The cardiopericardial silhouette is within normal limits. There is no pleural  effusion, pneumothorax or focal consolidation present. Impression: No acute intrathoracic disease. Current Outpatient Medications   Medication Sig Dispense Refill    levothyroxine (SYNTHROID) 100 mcg tablet Take 1 Tablet by mouth Daily (before breakfast). 90 Tablet 0    fluticasone propion-salmeteroL (ADVAIR/WIXELA) 500-50 mcg/dose diskus inhaler Take 1 Puff by inhalation every twelve (12) hours. 180 Each 0    albuterol (PROVENTIL VENTOLIN) 2.5 mg /3 mL (0.083 %) nebu 3 mL by Nebulization route every four (4) hours as needed for Wheezing, Shortness of Breath or Respiratory Distress. 12 Each 0    nicotine (NICODERM CQ) 14 mg/24 hr patch 1 Patch by TransDERmal route every twenty-four (24) hours for 30 days. 30 Patch 0    Nebulizer & Compressor machine 1 Each by Does Not Apply route every four (4) hours as needed for Wheezing, Shortness of Breath or Cough. 1 Each 0    montelukast (SINGULAIR) 10 mg tablet Take 1 Tablet by mouth in the morning.  90 Tablet 3    fluticasone propionate (FLONASE) 50 mcg/actuation nasal spray BLOW NOSE: THEN USE 2 SPRAYS IN EACH NOSTRIL 2 TIMES A DAY AS NEEDED FOR CONGESTION 16 g 2    docusate sodium (DOK) 100 mg capsule 1 CAPSULE BY MOUTH EVERY DAY 90 Capsule 3    cholecalciferol, vitamin D3, 50 mcg (2,000 unit) tab Take 1 Tablet by mouth daily. 90 Tablet 3    acetaminophen (TYLENOL) 500 mg tablet TAKE 1 TABLET BY MOUTH EVERY 6 HOURS AS NEEDED FOR PAIN OR FEVER 30 Tablet 2    sunscreen 15 SPF lotion Use it as needed. 1 Bottle 1    gabapentin (NEURONTIN) 400 mg capsule Take 800 mg by mouth two (2) times a day. lithium carbonate (LITHOBID) 150 mg capsule Take 450 mg by mouth every morning. lithium carbonate (LITHONATE) 300 mg capsule Take 600 mg by mouth nightly. morning       OLANZapine (ZYPREXA) 15 mg tablet Take 30 mg by mouth nightly. QUEtiapine (SEROQUEL) 400 mg tablet Take 400 mg by mouth two (2) times a day. sertraline (ZOLOFT) 50 mg tablet Take 50 mg by mouth nightly. The past medical history, past surgical history, and family history were reviewed and updated in the medical record. Lab values/Imaging were reviewed. The medications were reviewed and updated in the medical record. Immunizations were reviewed and updated in the medical record. All relevant preventative screenings reviewed and updated in the medical record. REVIEW OF SYSTEMS   Review of Systems   Constitutional:  Negative for chills, fever, malaise/fatigue and weight loss. HENT:  Negative for congestion, ear pain, hearing loss, sinus pain, sore throat and tinnitus. Eyes:  Negative for blurred vision. Respiratory:  Negative for cough, hemoptysis, sputum production, shortness of breath, wheezing and stridor. Cardiovascular:  Negative for chest pain and leg swelling. Gastrointestinal:  Negative for constipation and heartburn. Genitourinary:  Negative for frequency and urgency. Musculoskeletal:  Negative for back pain, joint pain and myalgias.    Neurological:  Negative for dizziness, tingling, tremors, sensory change, speech change, focal weakness, weakness and headaches. Endo/Heme/Allergies:  Negative for environmental allergies. Psychiatric/Behavioral:  Negative for depression and memory loss. The patient is not nervous/anxious and does not have insomnia. PHYSICAL EXAM   /75 (BP 1 Location: Left upper arm, BP Patient Position: Sitting, BP Cuff Size: Adult)   Pulse 75   Temp 97.3 °F (36.3 °C) (Temporal)   Resp 12   Ht 5' 9\" (1.753 m)   Wt 157 lb 9.6 oz (71.5 kg)   SpO2 98%   BMI 23.27 kg/m²      Physical Exam  Vitals and nursing note reviewed. Constitutional:       General: He is not in acute distress. Appearance: Normal appearance. He is well-developed. He is not diaphoretic. HENT:      Head: Normocephalic and atraumatic. Right Ear: Tympanic membrane, ear canal and external ear normal. There is no impacted cerumen. Left Ear: Tympanic membrane, ear canal and external ear normal. There is no impacted cerumen. Nose: No congestion or rhinorrhea. Mouth/Throat:      Mouth: Mucous membranes are moist.      Pharynx: Oropharynx is clear. Eyes:      General: No scleral icterus. Right eye: No discharge. Left eye: No discharge. Extraocular Movements: Extraocular movements intact. Conjunctiva/sclera: Conjunctivae normal.   Neck:      Thyroid: No thyromegaly. Cardiovascular:      Rate and Rhythm: Normal rate and regular rhythm. Pulses: Normal pulses. Dorsalis pedis pulses are 2+ on the right side and 2+ on the left side. Heart sounds: Normal heart sounds. No murmur heard. Pulmonary:      Effort: Pulmonary effort is normal.      Breath sounds: Normal breath sounds. No wheezing. Abdominal:      General: Bowel sounds are normal. There is no distension. Palpations: Abdomen is soft. Tenderness: There is no abdominal tenderness. Musculoskeletal:      Cervical back: Normal range of motion and neck supple. Right lower leg: No edema. Left lower leg: No edema. Comments: B/L knees without crepitus   Lymphadenopathy:      Cervical: No cervical adenopathy. Skin:     General: Skin is warm and dry. Capillary Refill: Capillary refill takes less than 2 seconds. Neurological:      General: No focal deficit present. Cranial Nerves: No cranial nerve deficit. Motor: No weakness. Deep Tendon Reflexes:      Reflex Scores:       Patellar reflexes are 2+ on the right side and 2+ on the left side. Psychiatric:         Mood and Affect: Mood normal.          ASSESSMENT/ PLAN   Below is the assessment and plan developed based on review of pertinent history, physical exam, labs, studies, and medications. 1. Acquired hypothyroidism  -     levothyroxine (SYNTHROID) 100 mcg tablet; Take 1 Tablet by mouth Daily (before breakfast). , Normal, Disp-90 Tablet, R-0Appointment needed for future refills.  -     THYROID CASCADE PROFILE; Future  2. Moderate persistent asthma without complication  Managed primarily by Pulmonology. Continue with Singulair.   -     fluticasone propion-salmeteroL (ADVAIR/WIXELA) 500-50 mcg/dose diskus inhaler; Take 1 Puff by inhalation every twelve (12) hours. , Normal, Disp-180 Each, R-0Appointment needed for future refills. -     albuterol (PROVENTIL VENTOLIN) 2.5 mg /3 mL (0.083 %) nebu; 3 mL by Nebulization route every four (4) hours as needed for Wheezing, Shortness of Breath or Respiratory Distress., Normal, Disp-12 Each, R-0Appointment needed for future refills. 3. Environmental and seasonal allergies  Continue with current treatment regimen. 4. Vitamin D deficiency disease  Continue with current treatment regimen. 5. Chronic idiopathic constipation  Continue with current treatment regimen. 6. Current smoker  -     nicotine (NICODERM CQ) 14 mg/24 hr patch; 1 Patch by TransDERmal route every twenty-four (24) hours for 30 days. , Normal, Disp-30 Patch, R-0  7.  Encounter for smoking cessation counseling  -     nicotine (Oscar Luis Armando) 14 mg/24 hr patch; 1 Patch by TransDERmal route every twenty-four (24) hours for 30 days. , Normal, Disp-30 Patch, R-0  8. History of ear pain  Has since resolved. 9. Medication management  -     METABOLIC PANEL, COMPREHENSIVE; Future           Follow-up and Dispositions    Return in about 4 months (around 5/4/2023) for Management of co-morbidities. Disclaimer:  Advised patient to call back or return to office if symptoms worsen/change/persist.  Discussed expected course/resolution/complications of diagnosis in detail with patient. Medication risks/benefits/alternatives discussed with patient. Patient was given an after visit summary which includes diagnoses, current medications, & vitals. Discussed patient instructions and advised to read to all patient instructions regarding care. Patient expressed understanding with the diagnosis and plan.        Marifer Khalil NP  1/4/2023

## 2023-01-04 NOTE — PROGRESS NOTES
Chief Complaint   Patient presents with    Follow-up    Medication Refill     Pt would like all medications refilled     Other     Would like to have a D/c'd order for catheter- caregiver states that he never uses it         Health Maintenance Due   Topic    Pneumococcal 0-64 years (1 - PCV)    COVID-19 Vaccine (4 - Booster for Moderna series)        1. \"Have you been to the ER, urgent care clinic since your last visit? Hospitalized since your last visit? \" No    2. \"Have you seen or consulted any other health care providers outside of the [address] [place] [address] since your last visit? \" No     3. For patients aged 39-70: Has the patient had a colonoscopy / FIT/ Cologuard? NA - based on age      If the patient is female:    4. For patients aged 41-77: Has the patient had a mammogram within the past 2 years? NA - based on age or sex      11. For patients aged 21-65: Has the patient had a pap smear?  NA - based on age or sex     Visit Vitals  /75 (BP 1 Location: Left upper arm, BP Patient Position: Sitting, BP Cuff Size: Adult)   Pulse 75   Temp 97.3 °F (36.3 °C) (Temporal)   Resp 12   Ht 5' 9\" (1.753 m)   Wt 157 lb 9.6 oz (71.5 kg)   SpO2 98%   BMI 23.27 kg/m²

## 2023-01-05 LAB
ALBUMIN SERPL-MCNC: 5 G/DL (ref 4–5)
ALBUMIN/GLOB SERPL: 2.3 {RATIO} (ref 1.2–2.2)
ALP SERPL-CCNC: 104 IU/L (ref 44–121)
ALT SERPL-CCNC: 17 IU/L (ref 0–44)
AST SERPL-CCNC: 20 IU/L (ref 0–40)
BILIRUB SERPL-MCNC: <0.2 MG/DL (ref 0–1.2)
BUN SERPL-MCNC: 8 MG/DL (ref 6–24)
BUN/CREAT SERPL: 11 (ref 9–20)
CALCIUM SERPL-MCNC: 10.2 MG/DL (ref 8.7–10.2)
CHLORIDE SERPL-SCNC: 102 MMOL/L (ref 96–106)
CO2 SERPL-SCNC: 25 MMOL/L (ref 20–29)
CREAT SERPL-MCNC: 0.74 MG/DL (ref 0.76–1.27)
EGFR: 117 ML/MIN/1.73
GLOBULIN SER CALC-MCNC: 2.2 G/DL (ref 1.5–4.5)
GLUCOSE SERPL-MCNC: 76 MG/DL (ref 70–99)
POTASSIUM SERPL-SCNC: 4.7 MMOL/L (ref 3.5–5.2)
PROT SERPL-MCNC: 7.2 G/DL (ref 6–8.5)
SODIUM SERPL-SCNC: 140 MMOL/L (ref 134–144)
TSH SERPL DL<=0.005 MIU/L-ACNC: 1.35 UIU/ML (ref 0.45–4.5)

## 2023-01-10 ENCOUNTER — NURSE TRIAGE (OUTPATIENT)
Dept: OTHER | Facility: CLINIC | Age: 43
End: 2023-01-10

## 2023-01-10 NOTE — TELEPHONE ENCOUNTER
Received call from Ryan Simmons at [facility] with Red Flag Complaint. Subjective: Caller states \"Hand swelling\"     Current Symptoms: Left hand swollen and red, yesterday, the entire hand. No problems with using hand or bending fingers. complained for the first time yesterday      Onset: Estrella Banks out of bed this morning but did not mention    Pain Severity: Mild per caller    Temperature: Denies fever, chills and sweats     What has been tried: Nothing    History related to today's reason for call: Problem list reviewed and no identified problems related to today's reason for callProblem list reviewed and no identified problems related to today's reason for call    Recommended disposition: See PCP within 3 Days    Care advice provided, patient verbalizes understanding; denies any other questions or concerns; instructed to call back for any new or worsening symptoms. Patient/Caller agrees with recommended disposition; writer provided warm transfer to Terrance Fam at [facility] for appointment scheduling    Attention Provider: Thank you for allowing me to participate in the care of your patient. The patient was connected to triage in response to information provided to the [facility]. Please do not respond through this encounter as the response is not directed to a shared pool.     Reason for Disposition   [1] MILD swelling (puffiness) of both hands AND [2] not better after 3 days    Protocols used: Hand Swelling-ADULT-AH

## 2023-01-13 ENCOUNTER — HOSPITAL ENCOUNTER (OUTPATIENT)
Dept: GENERAL RADIOLOGY | Age: 43
Discharge: HOME | End: 2023-01-13
Attending: NURSE PRACTITIONER
Payer: MEDICAID

## 2023-01-13 ENCOUNTER — OFFICE VISIT (OUTPATIENT)
Dept: PRIMARY CARE CLINIC | Age: 43
End: 2023-01-13
Payer: MEDICAID

## 2023-01-13 VITALS
HEIGHT: 69 IN | TEMPERATURE: 97.8 F | OXYGEN SATURATION: 94 % | SYSTOLIC BLOOD PRESSURE: 116 MMHG | RESPIRATION RATE: 12 BRPM | HEART RATE: 90 BPM | BODY MASS INDEX: 23.11 KG/M2 | WEIGHT: 156 LBS | DIASTOLIC BLOOD PRESSURE: 72 MMHG

## 2023-01-13 DIAGNOSIS — S62.91XA CLOSED FRACTURE OF RIGHT HAND, INITIAL ENCOUNTER: ICD-10-CM

## 2023-01-13 DIAGNOSIS — M79.641 RIGHT HAND PAIN: ICD-10-CM
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
## 2023-01-13 DIAGNOSIS — M25.641 DECREASED RANGE OF MOTION OF FINGER OF RIGHT HAND: ICD-10-CM

## 2023-01-13 PROCEDURE — 73130 X-RAY EXAM OF HAND: CPT

## 2023-01-13 RX ORDER — DICLOFENAC SODIUM 25 MG/1
25 TABLET, DELAYED RELEASE ORAL
Qty: 28 TABLET | Refills: 0 | Status: SHIPPED | OUTPATIENT
Start: 2023-01-13

## 2023-01-13 NOTE — PROGRESS NOTES
Chief Complaint   Patient presents with    Other     Pt states that he was trying to get get out of bed and that fell and landed on his right hand- [facility]- pt states that his finger in very sore       There are no preventive care reminders to display for this patient. 1. \"Have you been to the ER, urgent care clinic since your last visit? Hospitalized since your last visit? \" Yes[facility] for fall and broken finger    2. \"Have you seen or consulted any other health care providers outside of the [address] [place] [address] since your last visit? \" No     3. For patients aged 39-70: Has the patient had a colonoscopy / FIT/ Cologuard? No      If the patient is female:    4. For patients aged 41-77: Has the patient had a mammogram within the past 2 years? N/a based on age and sex      11. For patients aged 21-65: Has the patient had a pap smear?  NA - based on age or sex     Visit Vitals  /72 (BP 1 Location: Right arm, BP Patient Position: Sitting, BP Cuff Size: Adult)   Pulse 90   Temp 97.8 °F (36.6 °C) (Temporal)   Resp 12   Ht 5' 9\" (1.753 m)   Wt 156 lb (70.8 kg)   SpO2 94%   BMI 23.04 kg/m²

## 2023-01-13 NOTE — PROGRESS NOTES
[facility]   [address], [address] [facility]  P: [phone]  F: [phone]    SUBJECTIVE     HPI:     Ricarda Cotton is a 38 y.o. male who is seen in the clinic for   Chief Complaint   Patient presents with    Other     Pt states that he was trying to get get out of bed and that fell and landed on his right hand- [facility]- pt states that his finger in very sore      The patient presents today stating that on Tuesday morning, he fell out of bed. While falling, he tried to brace himself with his right hand. After falling, noticed right hand pain and swelling. Went to [facility] where X-ray confirmed a right hand fracture. Splint was placed. No referral to Orthopaedics was placed. Since incident, prn Tylenol has helped with the pain. Continues to have limited ROM of right hand/2nd-4th fingers. The patient is a member of a [employer] and is assisted today by a caregiver. Patient Active Problem List    Diagnosis    Depressive disorder    Mild intellectual disability    Schizoaffective disorder ([address]    Acute left-sided low back pain without sciatica    Acquired hypothyroidism    Acne    Nystagmus        Past Medical History:   Diagnosis Date    Depressive disorder     Hypothyroid     Schizoaffective disorder ([facility] [address]     Uses hearing aid      History reviewed. No pertinent surgical history.   Social History     Socioeconomic History    Marital status: SINGLE     Spouse name: Not on file    Number of children: Not on file    Years of education: Not on file    Highest education level: Not on file   Occupational History    Not on file   Tobacco Use    Smoking status: Every Day     Packs/day: 0.25     Types: Cigarettes    Smokeless tobacco: Current   Vaping Use    Vaping Use: Never used   Substance and Sexual Activity    Alcohol use: No    Drug use: No    Sexual activity: Never   Other Topics Concern    Not on file   Social History Narrative    Not on file     Social Determinants of Health     Financial Resource Strain: Not on file   Food Insecurity: Not on file   Transportation Needs: Not on file   Physical Activity: Not on file   Stress: Not on file   Social Connections: Not on file   Intimate Partner Violence: Not on file   Housing Stability: Not on file     History reviewed. No pertinent family history. Immunization History   Administered Date(s) Administered    COVID-19, MODERNA BLUE border, Primary or Immunocompromised, (age 18y+), IM, 100 mcg/0.5mL 01/22/2021, 03/02/2021    COVID-19, MODERNA Booster BLUE border, (age 18y+), IM, 50mcg/0.25mL 11/18/2021    Influenza, FLUARIX, FLULAVAL, FLUZONE (age 10 mo+) AND AFLURIA, (age 1 y+), PF, 0.5mL 11/13/2019, 10/13/2020, 10/18/2021, 10/12/2022    Tdap 06/08/2016      No Known Allergies    Office Visit on 01/04/2023   Component Date Value Ref Range Status    Glucose 01/04/2023 76  70 - 99 mg/dL Final    BUN 01/04/2023 8  6 - 24 mg/dL Final    Creatinine 01/04/2023 0.74 (A)  0.76 - 1.27 mg/dL Final    eGFR 01/04/2023 117  >59 mL/min/1.73 Final    BUN/Creatinine ratio 01/04/2023 11  9 - 20 Final    Sodium 01/04/2023 140  134 - 144 mmol/L Final    Potassium 01/04/2023 4.7  3.5 - 5.2 mmol/L Final    Chloride 01/04/2023 102  96 - 106 mmol/L Final    CO2 01/04/2023 25  20 - 29 mmol/L Final    Calcium 01/04/2023 10.2  8.7 - 10.2 mg/dL Final    Protein, total 01/04/2023 7.2  6.0 - 8.5 g/dL Final    Albumin 01/04/2023 5.0  4.0 - 5.0 g/dL Final    GLOBULIN, TOTAL 01/04/2023 2.2  1.5 - 4.5 g/dL Final    A-G Ratio 01/04/2023 2.3 (A)  1.2 - 2.2 Final    Bilirubin, total 01/04/2023 <0.2  0.0 - 1.2 mg/dL Final    Alk. phosphatase 01/04/2023 104  44 - 121 IU/L Final    AST (SGOT) 01/04/2023 20  0 - 40 IU/L Final    ALT (SGPT) 01/04/2023 17  0 - 44 IU/L Final    TSH 01/04/2023 1.350  0.450 - 4.500 uIU/mL Final    Comment: No apparent thyroid disorder. Additional testing not indicated.  In  rare instances, Secondary Hypothyroidism as well as Subclinical  Hypothyroidism have been reported in some patients with normal TSH  values. XR CHEST PA LAT  Narrative: Clinical history: Cough  INDICATION:   Cough  COMPARISON: 2017    FINDINGS:   PA and lateral views of the chest are obtained. The cardiopericardial silhouette is within normal limits. There is no pleural  effusion, pneumothorax or focal consolidation present. Impression: No acute intrathoracic disease. Current Outpatient Medications   Medication Sig Dispense Refill    diclofenac EC (VOLTAREN) 25 mg EC tablet Take 1 Tablet by mouth two (2) times daily as needed for Pain. 28 Tablet 0    acetaminophen (TYLENOL) 500 mg tablet TAKE 1 TABLET BY MOUTH EVERY 6 HOURS AS NEEDED FOR PAIN OR FEVER 120 Tablet 0    levothyroxine (SYNTHROID) 100 mcg tablet Take 1 Tablet by mouth Daily (before breakfast). 90 Tablet 0    Nebulizer & Compressor machine 1 Each by Does Not Apply route every four (4) hours as needed for Wheezing, Shortness of Breath or Cough. 1 Each 0    fluticasone propion-salmeteroL (ADVAIR/WIXELA) 500-50 mcg/dose diskus inhaler Take 1 Puff by inhalation every twelve (12) hours. 180 Each 0    albuterol (PROVENTIL VENTOLIN) 2.5 mg /3 mL (0.083 %) nebu 3 mL by Nebulization route every four (4) hours as needed for Wheezing, Shortness of Breath or Respiratory Distress. 12 Each 0    montelukast (SINGULAIR) 10 mg tablet Take 1 Tablet by mouth in the morning. 90 Tablet 3    fluticasone propionate (FLONASE) 50 mcg/actuation nasal spray BLOW NOSE: THEN USE 2 SPRAYS IN EACH NOSTRIL 2 TIMES A DAY AS NEEDED FOR CONGESTION 16 g 2    docusate sodium (DOK) 100 mg capsule 1 CAPSULE BY MOUTH EVERY DAY 90 Capsule 3    cholecalciferol, vitamin D3, 50 mcg (2,000 unit) tab Take 1 Tablet by mouth daily. 90 Tablet 3    sunscreen 15 SPF lotion Use it as needed. 1 Bottle 1    gabapentin (NEURONTIN) 400 mg capsule Take 800 mg by mouth two (2) times a day.       lithium carbonate (LITHOBID) 150 mg capsule Take 450 mg by mouth every morning. lithium carbonate (LITHONATE) 300 mg capsule Take 600 mg by mouth nightly. morning       OLANZapine (ZYPREXA) 15 mg tablet Take 30 mg by mouth nightly. QUEtiapine (SEROQUEL) 400 mg tablet Take 400 mg by mouth two (2) times a day. sertraline (ZOLOFT) 50 mg tablet Take 50 mg by mouth nightly. nicotine (NICODERM CQ) 14 mg/24 hr patch 1 Patch by TransDERmal route every twenty-four (24) hours for 30 days. (Patient not taking: Reported on 1/13/2023) 30 Patch 0           The past medical history, past surgical history, and family history were reviewed and updated in the medical record. Lab values/Imaging were reviewed. The medications were reviewed and updated in the medical record. Immunizations were reviewed and updated in the medical record. All relevant preventative screenings reviewed and updated in the medical record. REVIEW OF SYSTEMS   Review of Systems   Musculoskeletal:  Positive for falls and joint pain. PHYSICAL EXAM   /72 (BP 1 Location: Right arm, BP Patient Position: Sitting, BP Cuff Size: Adult)   Pulse 90   Temp 97.8 °F (36.6 °C) (Temporal)   Resp 12   Ht 5' 9\" (1.753 m)   Wt 156 lb (70.8 kg)   SpO2 94%   BMI 23.04 kg/m²      Physical Exam  Musculoskeletal:         General: Swelling, tenderness and deformity present. Comments: Splint in place properly          ASSESSMENT/ PLAN   Below is the assessment and plan developed based on review of pertinent history, physical exam, labs, studies, and medications. 1. Fall, initial encounter  -     XR HAND RT MIN 3 V; Future  -     REFERRAL TO ORTHOPEDICS  2. Closed fracture of right hand, initial encounter  -     XR HAND RT MIN 3 V; Future  -     REFERRAL TO ORTHOPEDICS  3. Right hand pain  -     XR HAND RT MIN 3 V; Future  -     diclofenac EC (VOLTAREN) 25 mg EC tablet;  Take 1 Tablet by mouth two (2) times daily as needed for Pain., Normal, Disp-28 Tablet, R-0Appointment needed for future refills.  -     REFERRAL TO ORTHOPEDICS  4. Decreased range of motion of finger of right hand  -     XR HAND RT MIN 3 V; Future  -     REFERRAL TO ORTHOPEDICS     Educated caregiver about RICE to manage the patient's symptoms. Continue to wear splint daily. Follow-up and Dispositions    Return if symptoms worsen or fail to improve with the interventions above. Disclaimer:  Advised patient to call back or return to office if symptoms worsen/change/persist.  Discussed expected course/resolution/complications of diagnosis in detail with patient. Medication risks/benefits/alternatives discussed with patient. Patient was given an after visit summary which includes diagnoses, current medications, & vitals. Discussed patient instructions and advised to read to all patient instructions regarding care. Patient expressed understanding with the diagnosis and plan.        Acosta Downs NP  1/13/2023

## 2023-01-20 DIAGNOSIS — J45.40 MODERATE PERSISTENT ASTHMA WITHOUT COMPLICATION: ICD-10-CM

## 2023-01-20 NOTE — TELEPHONE ENCOUNTER
Pharmacy requested refill. PCP: Dacia Hercules NP    Last appt: 1/13/2023  Future Appointments   Date Time Provider Jaymie Giraldo   4/4/2023 10:40 AM Dacia Hercules NP [facility] AMB       Requested Prescriptions     Pending Prescriptions Disp Refills    QUEtiapine (SEROquel) 400 mg tablet       Sig: Take 1 Tablet by mouth two (2) times a day. albuterol sulfate (PROAIR RESPICLICK) 90 mcg/actuation breath activated inhaler 1 Each 0     Sig: Take 2 Puffs by inhalation every four (4) hours as needed for Wheezing or Shortness of Breath. lithium carbonate 300 mg capsule       Sig: Take 2 Capsules by mouth nightly. morning    albuterol-ipratropium (DUO-NEB) 2.5 mg-0.5 mg/3 ml nebu 12 Each 0     Sig: 3 mL by Nebulization route every four (4) hours as needed for Wheezing.        Prior labs and Blood pressures:  BP Readings from Last 3 Encounters:   01/13/23 116/72   01/04/23 116/75   11/16/22 120/73     Lab Results   Component Value Date/Time    Sodium 140 01/04/2023 12:00 AM    Potassium 4.7 01/04/2023 12:00 AM    Chloride 102 01/04/2023 12:00 AM    CO2 25 01/04/2023 12:00 AM    Anion gap 4 (L) 10/16/2019 02:24 PM    Glucose 76 01/04/2023 12:00 AM    BUN 8 01/04/2023 12:00 AM    Creatinine 0.74 (L) 01/04/2023 12:00 AM    BUN/Creatinine ratio 11 01/04/2023 12:00 AM    GFR est  10/18/2021 12:00 AM    GFR est non- 10/18/2021 12:00 AM    Calcium 10.2 01/04/2023 12:00 AM

## 2023-01-21 RX ORDER — IPRATROPIUM BROMIDE AND ALBUTEROL SULFATE 2.5; .5 MG/3ML; MG/3ML
3 SOLUTION RESPIRATORY (INHALATION)
Qty: 28 EACH | Refills: 0 | Status: SHIPPED | OUTPATIENT
Start: 2023-01-21

## 2023-01-21 RX ORDER — LITHIUM CARBONATE 300 MG/1
600 CAPSULE ORAL
OUTPATIENT
Start: 2023-01-21

## 2023-01-21 RX ORDER — QUETIAPINE FUMARATE 400 MG/1
400 TABLET, FILM COATED ORAL 2 TIMES DAILY
OUTPATIENT
Start: 2023-01-21

## 2023-02-03 ENCOUNTER — TELEPHONE (OUTPATIENT)
Dept: PRIMARY CARE CLINIC | Age: 43
End: 2023-02-03

## 2023-02-03 NOTE — TELEPHONE ENCOUNTER
Daniel guidry from home care delivery about paper they faxed over.     V:[phone]  C:[phone]

## 2023-04-19 ENCOUNTER — TELEPHONE (OUTPATIENT)
Dept: PRIMARY CARE CLINIC | Age: 43
End: 2023-04-19

## 2023-04-19 DIAGNOSIS — B96.20 E. COLI UTI: Primary | ICD-10-CM

## 2023-04-19 DIAGNOSIS — N39.0 E. COLI UTI: Primary | ICD-10-CM

## 2023-04-19 RX ORDER — NITROFURANTOIN 25; 75 MG/1; MG/1
100 CAPSULE ORAL 2 TIMES DAILY
Qty: 14 CAPSULE | Refills: 0 | Status: SHIPPED | OUTPATIENT
Start: 2023-04-19 | End: 2023-04-26

## 2023-04-19 NOTE — TELEPHONE ENCOUNTER
----- Message from Di Delarosa NP sent at 4/19/2023  3:55 PM EDT -----  Macrobid sent to the pharmacy. He should contact Dr. Gisele Lock (Urology) about this. Thanks Jan Olguin. ----- Message -----  From: Tabby Sullivan  Sent: 4/19/2023   2:53 PM EDT  To: Di Delarosa NP    Pt has no complaints of pain but does urinate more and urine has a foul smell.   ----- Message -----  From: Óscar Iniguez NP  Sent: 4/18/2023   5:01 PM EDT  To: Rober Rocha, please contact the patient's [employer] to relay this message ASAP. Urine Culture shows E. Coli present. Does he have any urinary symptoms? If so, will send in appropriate med. All other labs are unremarkable.

## 2023-04-19 NOTE — TELEPHONE ENCOUNTER
----- Message from Brigid Sykes NP sent at 4/18/2023  5:01 PM EDT -----  Ezra Oliveira, please contact the patient's [employer] to relay this message ASAP. Urine Culture shows E. Coli present. Does he have any urinary symptoms? If so, will send in appropriate med. All other labs are unremarkable.

## 2023-04-21 ENCOUNTER — TELEPHONE (OUTPATIENT)
Dept: PRIMARY CARE CLINIC | Age: 43
End: 2023-04-21

## 2023-04-21 NOTE — TELEPHONE ENCOUNTER
Nabeel Bill called about some paperwork that was reportedly faxed over. Nabeel Bill could not recall the precise nature of the papers we should have received.     Phone: [phone]

## 2023-05-01 NOTE — PROGRESS NOTES
Chief Complaint   Patient presents with    Follow-up       Health Maintenance Due   Topic    Depression Monitoring     Flu Vaccine (1)        1. Have you been to the ER, urgent care clinic since your last visit? Hospitalized since your last visit? 10/07/2022- for ear infection- [facility]     2. Have you seen or consulted any other health care providers outside of the [address] [place] [address] since your last visit? Include any pap smears or colon screening.  No    Visit Vitals  /70 (BP 1 Location: Left upper arm, BP Patient Position: Sitting, BP Cuff Size: Adult)   Pulse 76   Temp 97.1 °F (36.2 °C) (Temporal)   Resp 16   Ht 5' 9\" (1.753 m)   Wt 155 lb (70.3 kg)   SpO2 97%   BMI 22.89 kg/m² Department of Anesthesiology  Postprocedure Note    Patient: Jason Rubio  MRN: [id number]  YOB: 1948  Date of evaluation: 5/1/2023      Procedure Summary     Date: 05/01/23 Room / Location: [facility] [place] [address] / [facility]    Anesthesia Start: 2300 Anesthesia Stop: 5185    Procedures:       EGD BIOPSY      COLONOSCOPY WITH BIOPSY      COLONOSCOPY SUBMUCOSAL TATTOO Diagnosis:       Severe anemia      (Severe anemia [D64.9])    Surgeons: Dafne Cai MD Responsible Provider: Prince Ciaran MD    Anesthesia Type: MAC ASA Status: 4 - Emergent          Anesthesia Type: No value filed.     Jesus Phase I: Jesus Score: 10    Jesus Phase II: Jesus Score: 10      Anesthesia Post Evaluation    Patient location during evaluation: PACU  Patient participation: complete - patient participated  Level of consciousness: awake and alert  Airway patency: patent  Nausea & Vomiting: no nausea and no vomiting  Complications: no  Cardiovascular status: hemodynamically stable  Respiratory status: acceptable  Hydration status: euvolemic  Multimodal analgesia pain management approach

## 2023-05-21 RX ORDER — ALBUTEROL SULFATE 2.5 MG/3ML
SOLUTION RESPIRATORY (INHALATION)
COMMUNITY
Start: 2023-02-08

## 2023-05-21 RX ORDER — DICLOFENAC SODIUM 25 MG/1
25 TABLET, DELAYED RELEASE ORAL 2 TIMES DAILY PRN
COMMUNITY
Start: 2023-02-08 | End: 2023-06-19

## 2023-05-21 RX ORDER — KETOCONAZOLE 20 MG/G
CREAM TOPICAL DAILY
COMMUNITY
Start: 2023-04-14 | End: 2023-06-19 | Stop reason: SDUPTHER

## 2023-05-21 RX ORDER — OLANZAPINE 15 MG/1
2 TABLET ORAL NIGHTLY
COMMUNITY

## 2023-05-21 RX ORDER — FLUTICASONE PROPIONATE 50 MCG
SPRAY, SUSPENSION (ML) NASAL
COMMUNITY
Start: 2023-04-14 | End: 2023-06-19 | Stop reason: SDUPTHER

## 2023-05-21 RX ORDER — ACETAMINOPHEN 500 MG
TABLET ORAL
COMMUNITY
Start: 2023-01-19 | End: 2023-06-19 | Stop reason: SDUPTHER

## 2023-05-21 RX ORDER — GABAPENTIN 400 MG/1
800 CAPSULE ORAL 2 TIMES DAILY
COMMUNITY

## 2023-05-21 RX ORDER — MONTELUKAST SODIUM 10 MG/1
TABLET ORAL
COMMUNITY
Start: 2023-01-19 | End: 2023-06-19 | Stop reason: SDUPTHER

## 2023-05-21 RX ORDER — QUETIAPINE FUMARATE 400 MG/1
400 TABLET, FILM COATED ORAL 2 TIMES DAILY
COMMUNITY

## 2023-05-21 RX ORDER — PSEUDOEPHEDRINE HCL 30 MG
TABLET ORAL
COMMUNITY
Start: 2023-01-19 | End: 2023-06-19 | Stop reason: SDUPTHER

## 2023-05-21 RX ORDER — LEVOTHYROXINE SODIUM 0.1 MG/1
TABLET ORAL
COMMUNITY
Start: 2023-04-14 | End: 2023-06-19 | Stop reason: SDUPTHER

## 2023-05-21 RX ORDER — LITHIUM CARBONATE 300 MG/1
300 CAPSULE ORAL
COMMUNITY

## 2023-06-19 ENCOUNTER — OFFICE VISIT (OUTPATIENT)
Dept: PRIMARY CARE CLINIC | Facility: CLINIC | Age: 43
End: 2023-06-19
Payer: MEDICAID

## 2023-06-19 VITALS
SYSTOLIC BLOOD PRESSURE: 110 MMHG | WEIGHT: 154 LBS | DIASTOLIC BLOOD PRESSURE: 70 MMHG | OXYGEN SATURATION: 99 % | RESPIRATION RATE: 16 BRPM | HEART RATE: 74 BPM | HEIGHT: 69 IN | TEMPERATURE: 97.3 F | BODY MASS INDEX: 22.81 KG/M2

## 2023-06-19 DIAGNOSIS — Z00.00 ANNUAL PHYSICAL EXAM: ICD-10-CM

## 2023-06-19 DIAGNOSIS — H91.93 BILATERAL HEARING LOSS, UNSPECIFIED HEARING LOSS TYPE: ICD-10-CM

## 2023-06-19 DIAGNOSIS — E55.9 VITAMIN D DEFICIENCY DISEASE: ICD-10-CM

## 2023-06-19 DIAGNOSIS — R73.02 IMPAIRED GLUCOSE TOLERANCE (ORAL): ICD-10-CM

## 2023-06-19 DIAGNOSIS — N40.0 BENIGN PROSTATIC HYPERPLASIA WITHOUT LOWER URINARY TRACT SYMPTOMS: ICD-10-CM

## 2023-06-19 DIAGNOSIS — K59.04 CHRONIC IDIOPATHIC CONSTIPATION: ICD-10-CM

## 2023-06-19 DIAGNOSIS — F25.9 SCHIZOAFFECTIVE DISORDER, UNSPECIFIED TYPE (HCC): ICD-10-CM

## 2023-06-19 DIAGNOSIS — E03.9 ACQUIRED HYPOTHYROIDISM: ICD-10-CM

## 2023-06-19 DIAGNOSIS — F33.42 MAJOR DEPRESSIVE DISORDER, RECURRENT, IN FULL REMISSION (HCC): Primary | ICD-10-CM

## 2023-06-19 DIAGNOSIS — L70.0 ACNE VULGARIS: ICD-10-CM

## 2023-06-19 DIAGNOSIS — F17.210 CIGARETTE NICOTINE DEPENDENCE WITHOUT COMPLICATION: ICD-10-CM

## 2023-06-19 DIAGNOSIS — J30.89 ENVIRONMENTAL AND SEASONAL ALLERGIES: ICD-10-CM

## 2023-06-19 DIAGNOSIS — L21.9 CHRONIC SEBORRHEIC DERMATITIS: ICD-10-CM

## 2023-06-19 DIAGNOSIS — J45.40 MODERATE PERSISTENT ASTHMA, UNCOMPLICATED: ICD-10-CM

## 2023-06-19 DIAGNOSIS — R52 PAIN: ICD-10-CM

## 2023-06-19 DIAGNOSIS — H55.00 NYSTAGMUS: ICD-10-CM

## 2023-06-19 DIAGNOSIS — F70 MILD INTELLECTUAL DISABILITY: ICD-10-CM

## 2023-06-19 DIAGNOSIS — Z87.81 HISTORY OF HAND FRACTURE: ICD-10-CM

## 2023-06-19 PROCEDURE — 99214 OFFICE O/P EST MOD 30 MIN: CPT | Performed by: NURSE PRACTITIONER

## 2023-06-19 RX ORDER — ACETAMINOPHEN 500 MG
500 TABLET ORAL EVERY 6 HOURS PRN
Qty: 120 TABLET | Refills: 1 | Status: SHIPPED | OUTPATIENT
Start: 2023-06-19

## 2023-06-19 RX ORDER — FLUTICASONE PROPIONATE 50 MCG
1 SPRAY, SUSPENSION (ML) NASAL DAILY
Qty: 16 G | Refills: 3 | Status: SHIPPED | OUTPATIENT
Start: 2023-06-19 | End: 2023-06-23 | Stop reason: SDUPTHER

## 2023-06-19 RX ORDER — LEVOTHYROXINE SODIUM 0.1 MG/1
100 TABLET ORAL DAILY
Qty: 90 TABLET | Refills: 1 | Status: SHIPPED | OUTPATIENT
Start: 2023-06-19

## 2023-06-19 RX ORDER — KETOCONAZOLE 20 MG/G
CREAM TOPICAL DAILY
Qty: 60 G | Refills: 1 | Status: SHIPPED | OUTPATIENT
Start: 2023-06-19

## 2023-06-19 RX ORDER — MONTELUKAST SODIUM 10 MG/1
10 TABLET ORAL NIGHTLY
Qty: 90 TABLET | Refills: 1 | Status: SHIPPED | OUTPATIENT
Start: 2023-06-19

## 2023-06-19 RX ORDER — PSEUDOEPHEDRINE HCL 30 MG
100 TABLET ORAL 2 TIMES DAILY
Qty: 180 CAPSULE | Refills: 3 | Status: SHIPPED | OUTPATIENT
Start: 2023-06-19 | End: 2023-06-23 | Stop reason: SDUPTHER

## 2023-06-19 SDOH — ECONOMIC STABILITY: INCOME INSECURITY: HOW HARD IS IT FOR YOU TO PAY FOR THE VERY BASICS LIKE FOOD, HOUSING, MEDICAL CARE, AND HEATING?: PATIENT DECLINED

## 2023-06-19 SDOH — ECONOMIC STABILITY: FOOD INSECURITY: WITHIN THE PAST 12 MONTHS, YOU WORRIED THAT YOUR FOOD WOULD RUN OUT BEFORE YOU GOT MONEY TO BUY MORE.: PATIENT DECLINED

## 2023-06-19 SDOH — ECONOMIC STABILITY: HOUSING INSECURITY
IN THE LAST 12 MONTHS, WAS THERE A TIME WHEN YOU DID NOT HAVE A STEADY PLACE TO SLEEP OR SLEPT IN A SHELTER (INCLUDING NOW)?: PATIENT REFUSED

## 2023-06-19 SDOH — ECONOMIC STABILITY: FOOD INSECURITY: WITHIN THE PAST 12 MONTHS, THE FOOD YOU BOUGHT JUST DIDN'T LAST AND YOU DIDN'T HAVE MONEY TO GET MORE.: PATIENT DECLINED

## 2023-06-19 ASSESSMENT — ENCOUNTER SYMPTOMS
EYE DISCHARGE: 0
SHORTNESS OF BREATH: 0
CONSTIPATION: 0
COLOR CHANGE: 0
CHEST TIGHTNESS: 0
RHINORRHEA: 0
BACK PAIN: 0
ABDOMINAL PAIN: 0
DIARRHEA: 0
COUGH: 0
SORE THROAT: 0

## 2023-06-19 ASSESSMENT — PATIENT HEALTH QUESTIONNAIRE - PHQ9: DEPRESSION UNABLE TO ASSESS: FUNCTIONAL CAPACITY MOTIVATION LIMITS ACCURACY

## 2023-06-19 NOTE — PROGRESS NOTES
Identified pt with two pt identifiers(name and ). Chief Complaint   Patient presents with    Annual Exam    Medication Refill        No flowsheet data found. Vitals:    23 0836   BP: 98/61   Site: Left Upper Arm   Position: Sitting   Cuff Size: Medium Adult   Pulse: 74   Resp: 16   Temp: 97.3 °F (36.3 °C)   TempSrc: Temporal   SpO2: 99%   Weight: 154 lb (69.9 kg)   Height: 5' 9\" (1.753 m)        Health Maintenance Due   Topic Date Due    Varicella vaccine (1 of 2 - 2-dose childhood series) Never done    Pneumococcal 0-64 years Vaccine (1 - PCV) Never done    HIV screen  Never done    COVID-19 Vaccine (4 - Booster for Moderna series) 2022        1. Have you been to the ER, urgent care clinic since your last visit? Hospitalized since your last visit? No    2. Have you seen or consulted any other health care providers outside of the [address] [place] [address] since your last visit? Include any pap smears or colon screening. No    3. For patients aged 39-70: Has the patient had a colonoscopy / FIT/ Cologuard? N/A      If the patient is female:    4. For patients aged 41-77: Has the patient had a mammogram within the past 2 years? N/A      5. For patients aged 21-65: Has the patient had a pap smear?  N/A

## 2023-06-19 NOTE — PROGRESS NOTES
[facility]   [facility], [address] [facility]  P: [phone]  F: [phone]    SUBJECTIVE     HPI:     Karrie Whitehead is a 38 y.o. male who is seen in the clinic for   Chief Complaint   Patient presents with    Annual Exam    Medication Refill      The patient presents today for his Annual Physical Exam.    He has Mild ID and therefore resides at [facility] [place] [address]. Specialists: Dr. Jayna Cisneros (Psychiatry), Dr. Henny Leyva (Pulmonology), Dr. Regina Camargo (Urology)    Past Medical History: Schizoaffective Disorder, MDD, Vitamin D Deficiency, Acquired Hypothyroidism, Chronic Bilateral Hearing Loss (uses hearing aids), Nystagmus, Allergies, Asthma, Cigarette Smoker, Right Hand Fx, Chronic Idiopathic Constipation, BPH, Seborrheic Dermatitis   Past Surgical History:   Family Medical History:     Pertinent health screenings: Up to date  Immunizations:  Not up to date. Declines. Patient Active Problem List   Diagnosis    Mild intellectual disability    Acute left-sided low back pain without sciatica    Acquired hypothyroidism    Depressive disorder    Acne    Nystagmus    Schizoaffective disorder ([facility] [address]        Past Medical History:   Diagnosis Date    Depressive disorder     Hypothyroid     Schizoaffective disorder ([facility] [address]     Uses hearing aid      History reviewed. No pertinent surgical history.   Social History     Socioeconomic History    Marital status: Single     Spouse name: Not on file    Number of children: Not on file    Years of education: Not on file    Highest education level: Not on file   Occupational History    Not on file   Tobacco Use    Smoking status: Every Day     Packs/day: 0.25     Types: Cigarettes    Smokeless tobacco: Current   Substance and Sexual Activity    Alcohol use: No    Drug use: No    Sexual activity: Not on file   Other Topics Concern    Not on file   Social History Narrative    Not on file     Social Determinants of Health     Financial Resource Strain:

## 2023-06-23 ENCOUNTER — TELEPHONE (OUTPATIENT)
Dept: PRIMARY CARE CLINIC | Facility: CLINIC | Age: 43
End: 2023-06-23

## 2023-06-23 DIAGNOSIS — K59.04 CHRONIC IDIOPATHIC CONSTIPATION: ICD-10-CM

## 2023-06-23 DIAGNOSIS — J30.89 ENVIRONMENTAL AND SEASONAL ALLERGIES: ICD-10-CM

## 2023-06-23 RX ORDER — FLUTICASONE PROPIONATE 50 MCG
1 SPRAY, SUSPENSION (ML) NASAL DAILY
Qty: 16 G | Refills: 3 | Status: SHIPPED | OUTPATIENT
Start: 2023-06-23

## 2023-06-23 RX ORDER — PSEUDOEPHEDRINE HCL 30 MG
100 TABLET ORAL 2 TIMES DAILY
Qty: 180 CAPSULE | Refills: 3 | Status: SHIPPED | OUTPATIENT
Start: 2023-06-23

## 2023-06-23 NOTE — TELEPHONE ENCOUNTER
Kaley needs clarification on fluticasone and  mg. Instructions have been changed from what was previously sent to the patient's group home and they need to know how to dispense. We can respond by phone or by fax.     Phone: [phone]

## 2023-06-27 ENCOUNTER — TELEPHONE (OUTPATIENT)
Dept: PRIMARY CARE CLINIC | Facility: CLINIC | Age: 43
End: 2023-06-27

## 2023-06-27 NOTE — TELEPHONE ENCOUNTER
Pharmacist from  [address] said she needs clarification on directions for 2 medications:  Fluticasone 50 mcg/act nasal spray  Docusate 100 mg caps  Pharmacist asked for REJI Petty to call [phone] for clarification

## 2023-06-28 DIAGNOSIS — J30.89 ENVIRONMENTAL AND SEASONAL ALLERGIES: ICD-10-CM

## 2023-06-28 DIAGNOSIS — K59.04 CHRONIC IDIOPATHIC CONSTIPATION: ICD-10-CM

## 2023-06-28 RX ORDER — FLUTICASONE PROPIONATE 50 MCG
1 SPRAY, SUSPENSION (ML) NASAL DAILY
Qty: 16 G | Refills: 3 | Status: SHIPPED | OUTPATIENT
Start: 2023-06-28 | End: 2023-06-28 | Stop reason: SDUPTHER

## 2023-06-28 RX ORDER — FLUTICASONE PROPIONATE 50 MCG
2 SPRAY, SUSPENSION (ML) NASAL DAILY
Qty: 16 G | Refills: 3 | Status: SHIPPED | OUTPATIENT
Start: 2023-06-28

## 2023-06-28 RX ORDER — PSEUDOEPHEDRINE HCL 30 MG
100 TABLET ORAL DAILY
Qty: 90 CAPSULE | Refills: 3 | Status: SHIPPED | OUTPATIENT
Start: 2023-06-28

## 2023-06-28 RX ORDER — PSEUDOEPHEDRINE HCL 30 MG
100 TABLET ORAL 2 TIMES DAILY
Qty: 180 CAPSULE | Refills: 3 | Status: SHIPPED | OUTPATIENT
Start: 2023-06-28 | End: 2023-06-28 | Stop reason: SDUPTHER

## 2023-09-14 DIAGNOSIS — K59.04 CHRONIC IDIOPATHIC CONSTIPATION: ICD-10-CM

## 2023-09-14 RX ORDER — DOCUSATE SODIUM 100 MG/1
CAPSULE, LIQUID FILLED ORAL
Qty: 90 CAPSULE | Refills: 3 | Status: SHIPPED | OUTPATIENT
Start: 2023-09-14

## 2023-10-20 ENCOUNTER — OFFICE VISIT (OUTPATIENT)
Dept: PRIMARY CARE CLINIC | Facility: CLINIC | Age: 43
End: 2023-10-20
Payer: MEDICAID

## 2023-10-20 VITALS
WEIGHT: 153 LBS | RESPIRATION RATE: 16 BRPM | SYSTOLIC BLOOD PRESSURE: 118 MMHG | BODY MASS INDEX: 22.66 KG/M2 | HEART RATE: 80 BPM | TEMPERATURE: 97.5 F | HEIGHT: 69 IN | OXYGEN SATURATION: 95 % | DIASTOLIC BLOOD PRESSURE: 75 MMHG

## 2023-10-20 DIAGNOSIS — K59.04 CHRONIC IDIOPATHIC CONSTIPATION: ICD-10-CM

## 2023-10-20 DIAGNOSIS — Z23 ENCOUNTER FOR IMMUNIZATION: ICD-10-CM

## 2023-10-20 DIAGNOSIS — L21.9 CHRONIC SEBORRHEIC DERMATITIS: ICD-10-CM

## 2023-10-20 DIAGNOSIS — Z79.899 MEDICATION MANAGEMENT: ICD-10-CM

## 2023-10-20 DIAGNOSIS — Z01.89 ENCOUNTER FOR ROUTINE LABORATORY TESTING: ICD-10-CM

## 2023-10-20 DIAGNOSIS — E03.9 ACQUIRED HYPOTHYROIDISM: Primary | ICD-10-CM

## 2023-10-20 DIAGNOSIS — J30.89 ENVIRONMENTAL AND SEASONAL ALLERGIES: ICD-10-CM

## 2023-10-20 DIAGNOSIS — E55.9 VITAMIN D DEFICIENCY DISEASE: ICD-10-CM

## 2023-10-20 DIAGNOSIS — R73.02 IMPAIRED GLUCOSE TOLERANCE (ORAL): ICD-10-CM

## 2023-10-20 PROCEDURE — 90471 IMMUNIZATION ADMIN: CPT | Performed by: NURSE PRACTITIONER

## 2023-10-20 PROCEDURE — 99214 OFFICE O/P EST MOD 30 MIN: CPT | Performed by: NURSE PRACTITIONER

## 2023-10-20 PROCEDURE — 90674 CCIIV4 VAC NO PRSV 0.5 ML IM: CPT | Performed by: NURSE PRACTITIONER

## 2023-10-20 RX ORDER — FLUTICASONE PROPIONATE AND SALMETEROL 50; 500 UG/1; UG/1
POWDER RESPIRATORY (INHALATION)
COMMUNITY
Start: 2023-08-30

## 2023-10-20 ASSESSMENT — ENCOUNTER SYMPTOMS
CONSTIPATION: 0
COUGH: 0
COLOR CHANGE: 0
SHORTNESS OF BREATH: 0
WHEEZING: 0
CHEST TIGHTNESS: 0

## 2023-10-20 ASSESSMENT — PATIENT HEALTH QUESTIONNAIRE - PHQ9: DEPRESSION UNABLE TO ASSESS: FUNCTIONAL CAPACITY MOTIVATION LIMITS ACCURACY

## 2023-10-20 NOTE — PROGRESS NOTES
[facility]   [address] [place] [address] [address] Mitra Diaz  P: [phone]  F: [phone]    SUBJECTIVE     HPI:     Kaiden Desir is a 41 y.o. male who is seen in the clinic for   Chief Complaint   Patient presents with    Follow-up        The patient presents to the office today for the management of his co-morbidities. The patient has Mild ID. Therefore, they reside at [facility]. Caregiver is present today and is assisting answering questions pertaining to the patient. Hx of Vitamin D Deficiency. Currently rx'd Vitamin D3 2,000 OU QD. Hx of Acquired Hypothyroidism. TSH was 1.1 on 4/14. Currently rx'd Levothyroxine 100 mcg QD. Hx pf Allergies. Currently rx'd Flonase and Singulair. Hx of Constipation. Currently rx'd Docusate Sodium 100 mg QD. Hx of Seborrheic Dermatitis. Currently rx'd Nizoral cream QD. After his last appointment, CBC, CMP, Vitamin D, HA1C, Lipid Panel, TSH/T4 ordered. Has not been collected. Patient Active Problem List   Diagnosis    Mild intellectual disability    Acute left-sided low back pain without sciatica    Acquired hypothyroidism    Depressive disorder    Acne    Nystagmus    Schizoaffective disorder ([address] )        Past Medical History:   Diagnosis Date    Depressive disorder     Hypothyroid     Schizoaffective disorder ([facility])     Uses hearing aid      History reviewed. No pertinent surgical history.   Social History     Socioeconomic History    Marital status: Single     Spouse name: Not on file    Number of children: Not on file    Years of education: Not on file    Highest education level: Not on file   Occupational History    Not on file   Tobacco Use    Smoking status: Every Day     Packs/day: .25     Types: Cigarettes    Smokeless tobacco: Current   Vaping Use    Vaping Use: Never used   Substance and Sexual Activity    Alcohol use: No    Drug use: No    Sexual activity: Not on file   Other Topics Concern    Not on file

## 2024-01-26 RX ORDER — FLUTICASONE PROPIONATE AND SALMETEROL 50; 500 UG/1; UG/1
POWDER RESPIRATORY (INHALATION)
Qty: 60 EACH | Refills: 0 | Status: SHIPPED | OUTPATIENT
Start: 2024-01-26

## 2024-01-26 NOTE — TELEPHONE ENCOUNTER
PCP: Robert Petty APRN - NP    Last Visit 10/20/2023   No future appointments.    Requested Prescriptions     Pending Prescriptions Disp Refills    ADVAIR DISKUS 500-50 MCG/ACT AEPB diskus inhaler [Pharmacy Med Name: ADVAIR 500-50 DISKUS]  11     Sig: INHALE 1 PUFF BY MOUTH EVERY 12 HOURS         Other Comments: Last Refill   10/20/23

## 2024-02-13 DIAGNOSIS — E03.9 ACQUIRED HYPOTHYROIDISM: ICD-10-CM

## 2024-02-13 RX ORDER — LEVOTHYROXINE SODIUM 0.1 MG/1
100 TABLET ORAL DAILY
Qty: 30 TABLET | Refills: 0 | Status: SHIPPED | OUTPATIENT
Start: 2024-02-13

## 2024-02-27 RX ORDER — FLUTICASONE PROPIONATE AND SALMETEROL 500; 50 UG/1; UG/1
1 POWDER RESPIRATORY (INHALATION) 2 TIMES DAILY
Qty: 60 EACH | Refills: 0 | Status: SHIPPED | OUTPATIENT
Start: 2024-02-27

## 2024-03-12 DIAGNOSIS — E03.9 ACQUIRED HYPOTHYROIDISM: ICD-10-CM

## 2024-03-12 RX ORDER — LEVOTHYROXINE SODIUM 0.1 MG/1
100 TABLET ORAL DAILY
Qty: 21 TABLET | Refills: 0 | Status: SHIPPED | OUTPATIENT
Start: 2024-03-12

## 2024-03-28 RX ORDER — FLUTICASONE PROPIONATE AND SALMETEROL 500; 50 UG/1; UG/1
1 POWDER RESPIRATORY (INHALATION) 2 TIMES DAILY
Qty: 60 EACH | Refills: 0 | Status: SHIPPED | OUTPATIENT
Start: 2024-03-28

## 2024-04-15 ENCOUNTER — TELEPHONE (OUTPATIENT)
Dept: PRIMARY CARE CLINIC | Facility: CLINIC | Age: 44
End: 2024-04-15

## 2024-04-15 DIAGNOSIS — E03.9 ACQUIRED HYPOTHYROIDISM: ICD-10-CM

## 2024-04-15 DIAGNOSIS — J45.40 MODERATE PERSISTENT ASTHMA, UNCOMPLICATED: ICD-10-CM

## 2024-04-15 DIAGNOSIS — J30.89 ENVIRONMENTAL AND SEASONAL ALLERGIES: ICD-10-CM

## 2024-04-15 RX ORDER — MONTELUKAST SODIUM 10 MG/1
TABLET ORAL
Qty: 30 TABLET | Refills: 11 | OUTPATIENT
Start: 2024-04-15

## 2024-04-15 RX ORDER — LEVOTHYROXINE SODIUM 0.1 MG/1
TABLET ORAL
Qty: 30 TABLET | Refills: 11 | OUTPATIENT
Start: 2024-04-15

## 2024-04-15 NOTE — TELEPHONE ENCOUNTER
PCP: Robert Petty APRN - NP    Last Visit 10/20/2023   No future appointments.    Requested Prescriptions     Pending Prescriptions Disp Refills    montelukast (SINGULAIR) 10 MG tablet [Pharmacy Med Name: MONTELUKAST SOD 10 MG TABLET] 30 tablet 11     Sig: TAKE 1 TABLET BY MOUTH DAILY FOR ASTHMA    levothyroxine (SYNTHROID) 100 MCG tablet [Pharmacy Med Name: LEVOTHYROXINE 100 MCG TABLET] 30 tablet 11     Sig: TAKE 1 TABLET BY MOUTH DAILY FOR HYPOTHYROIDISM         Other Comments: Last Refill - 06/19/2023 Montelukast and Synthroid 3/12/2024

## 2024-04-15 NOTE — TELEPHONE ENCOUNTER
Patient needs appt before anymore refills can be sent in.  He will need to schedule an appt with another provider since Gregory has left. CS

## 2024-04-16 DIAGNOSIS — E55.9 VITAMIN D DEFICIENCY DISEASE: ICD-10-CM

## 2024-04-16 DIAGNOSIS — K59.04 CHRONIC IDIOPATHIC CONSTIPATION: ICD-10-CM

## 2024-04-16 DIAGNOSIS — L21.9 CHRONIC SEBORRHEIC DERMATITIS: ICD-10-CM

## 2024-04-16 DIAGNOSIS — J45.40 MODERATE PERSISTENT ASTHMA, UNCOMPLICATED: ICD-10-CM

## 2024-04-16 DIAGNOSIS — R52 PAIN: ICD-10-CM

## 2024-04-16 DIAGNOSIS — E03.9 ACQUIRED HYPOTHYROIDISM: ICD-10-CM

## 2024-04-16 DIAGNOSIS — J30.89 ENVIRONMENTAL AND SEASONAL ALLERGIES: ICD-10-CM

## 2024-04-16 RX ORDER — GABAPENTIN 400 MG/1
800 CAPSULE ORAL 2 TIMES DAILY
Qty: 90 CAPSULE | OUTPATIENT
Start: 2024-04-16

## 2024-04-16 RX ORDER — MONTELUKAST SODIUM 10 MG/1
10 TABLET ORAL NIGHTLY
Qty: 90 TABLET | Refills: 1 | OUTPATIENT
Start: 2024-04-16

## 2024-04-16 RX ORDER — FLUTICASONE PROPIONATE AND SALMETEROL 500; 50 UG/1; UG/1
1 POWDER RESPIRATORY (INHALATION) 2 TIMES DAILY
Qty: 60 EACH | Refills: 0 | OUTPATIENT
Start: 2024-04-16

## 2024-04-16 RX ORDER — DOCUSATE SODIUM 100 MG/1
CAPSULE, LIQUID FILLED ORAL
Qty: 90 CAPSULE | Refills: 3 | OUTPATIENT
Start: 2024-04-16

## 2024-04-16 RX ORDER — FLUTICASONE PROPIONATE 50 MCG
2 SPRAY, SUSPENSION (ML) NASAL DAILY
Qty: 16 G | Refills: 3 | OUTPATIENT
Start: 2024-04-16

## 2024-04-16 RX ORDER — ACETAMINOPHEN 500 MG
500 TABLET ORAL EVERY 6 HOURS PRN
Qty: 120 TABLET | Refills: 1 | OUTPATIENT
Start: 2024-04-16

## 2024-04-16 RX ORDER — LEVOTHYROXINE SODIUM 0.1 MG/1
100 TABLET ORAL DAILY
Qty: 21 TABLET | Refills: 0 | OUTPATIENT
Start: 2024-04-16

## 2024-04-16 RX ORDER — KETOCONAZOLE 20 MG/G
CREAM TOPICAL DAILY
Qty: 60 G | Refills: 1 | OUTPATIENT
Start: 2024-04-16

## 2024-04-16 RX ORDER — ALBUTEROL SULFATE 2.5 MG/3ML
SOLUTION RESPIRATORY (INHALATION)
Qty: 120 EACH | OUTPATIENT
Start: 2024-04-16

## 2024-04-16 NOTE — TELEPHONE ENCOUNTER
Enid with [facility] called to schedule a [facility] appt with Dr. Saenz on 7/30/24.  Patient needs all of his medications sent to [facility] to take him to his appt on 7/30 with Dr. Saenz.

## 2024-04-24 DIAGNOSIS — J45.40 MODERATE PERSISTENT ASTHMA, UNCOMPLICATED: Primary | ICD-10-CM

## 2024-04-24 RX ORDER — ALBUTEROL SULFATE 2.5 MG/3ML
2.5 SOLUTION RESPIRATORY (INHALATION) EVERY 6 HOURS PRN
Qty: 120 EACH | Refills: 2 | Status: SHIPPED | OUTPATIENT
Start: 2024-04-24

## 2024-04-24 RX ORDER — ALBUTEROL SULFATE 2.5 MG/3ML
SOLUTION RESPIRATORY (INHALATION)
Qty: 120 EACH | Refills: 1 | Status: CANCELLED | OUTPATIENT
Start: 2024-04-24

## 2024-04-24 RX ORDER — FLUTICASONE PROPIONATE AND SALMETEROL 500; 50 UG/1; UG/1
1 POWDER RESPIRATORY (INHALATION) 2 TIMES DAILY
Qty: 60 EACH | Refills: 0 | Status: SHIPPED | OUTPATIENT
Start: 2024-04-24

## 2024-04-24 NOTE — TELEPHONE ENCOUNTER
Enid, the supervisor at the patients group [facility], says she is being audited and she needs new prescriptions sent to the pharmacy for the patient      Albuterol sulfate   Pro Air Inhaler    [facility]

## 2024-04-24 NOTE — TELEPHONE ENCOUNTER
PCP: Robert Petty, APRN - NP    Last Visit 10/20/2023   Future Appointments   Date Time Provider Department Center   7/30/2024 11:00 AM Gucci Saenz MD [facility] BS AMB       Requested Prescriptions      No prescriptions requested or ordered in this encounter         Other Comments: Last Refill

## 2024-04-26 ENCOUNTER — TELEPHONE (OUTPATIENT)
Dept: PRIMARY CARE CLINIC | Facility: CLINIC | Age: 44
End: 2024-04-26

## 2024-04-26 NOTE — TELEPHONE ENCOUNTER
PCP: Robert Petty, APRN - NP    Last Visit 10/20/2023   Future Appointments   Date Time Provider Department Center   7/30/2024 11:00 AM Gucci Saenz MD [facility] AMB       Requested Prescriptions      No prescriptions requested or ordered in this encounter     Patient needs levothyroxine and montelukast refilled at [facility]     Other Comments: Last Refill :  levothyroxine sent on 3/12/2024    Montelukast-06/19/1961    Next appt scheduled with Dr. Saenz on 7/30/2024

## 2024-04-29 NOTE — TELEPHONE ENCOUNTER
Called patient's care manager and patient's mother, no answer at either number, left voice messages at both numbers.

## 2024-05-15 DIAGNOSIS — R52 PAIN: ICD-10-CM

## 2024-05-15 RX ORDER — PSEUDOEPHED/ACETAMINOPH/DIPHEN 30MG-500MG
1 TABLET ORAL EVERY 6 HOURS PRN
Qty: 120 TABLET | Refills: 0 | OUTPATIENT
Start: 2024-05-15

## 2024-06-13 RX ORDER — CHOLECALCIFEROL (VITAMIN D3) 125 MCG
CAPSULE ORAL
Qty: 30 TABLET | Refills: 3 | Status: SHIPPED | OUTPATIENT
Start: 2024-06-13

## 2024-06-14 NOTE — TELEPHONE ENCOUNTER
PCP: Robert Petty, APRN - NP    Last Visit 10/20/2023   Future Appointments   Date Time Provider Department Center   7/30/2024 11:00 AM Gucci Saenz MD [facility] AM[facility]       Requested Prescriptions      No prescriptions requested or ordered in this encounter         Other Comments: Last Refill - appt with Dr. Saenz on 7/302024  Zachary Garcia with the Group Home and  requested a prescription for SPF 30 Sunscreen for the patient since they take them outside a lot.  Zachary Garcia is replacing Enid Hawkins at the Group Home.  Zachary would like a call back at #[phone].

## 2024-06-14 NOTE — TELEPHONE ENCOUNTER
Zachary Garcia with the Group Home and  requested a prescription for SPF 30 Sunscreen for the patient since they take them outside a lot.  Zachary Garcia is replacing Enid Hawkins at the Group Home.  Zachary would like a call back at #[phone].

## 2024-07-30 ENCOUNTER — OFFICE VISIT (OUTPATIENT)
Dept: PRIMARY CARE CLINIC | Facility: CLINIC | Age: 44
End: 2024-07-30
Payer: MEDICAID

## 2024-07-30 VITALS
TEMPERATURE: 98.3 F | RESPIRATION RATE: 20 BRPM | BODY MASS INDEX: 22.96 KG/M2 | SYSTOLIC BLOOD PRESSURE: 121 MMHG | HEIGHT: 69 IN | WEIGHT: 155 LBS | OXYGEN SATURATION: 95 % | DIASTOLIC BLOOD PRESSURE: 79 MMHG | HEART RATE: 71 BPM

## 2024-07-30 DIAGNOSIS — F33.42 MAJOR DEPRESSIVE DISORDER, RECURRENT, IN FULL REMISSION (HCC): ICD-10-CM

## 2024-07-30 DIAGNOSIS — E03.9 ACQUIRED HYPOTHYROIDISM: ICD-10-CM

## 2024-07-30 DIAGNOSIS — E55.9 VITAMIN D DEFICIENCY: ICD-10-CM

## 2024-07-30 DIAGNOSIS — F25.9 SCHIZOAFFECTIVE DISORDER, UNSPECIFIED TYPE (HCC): Primary | ICD-10-CM

## 2024-07-30 DIAGNOSIS — L30.1 DYSHIDROTIC ECZEMA: ICD-10-CM

## 2024-07-30 DIAGNOSIS — J45.40 MODERATE PERSISTENT ASTHMA, UNCOMPLICATED: ICD-10-CM

## 2024-07-30 DIAGNOSIS — F25.9 SCHIZOAFFECTIVE DISORDER, UNSPECIFIED TYPE (HCC): ICD-10-CM

## 2024-07-30 DIAGNOSIS — H90.0 CONDUCTIVE HEARING LOSS, BILATERAL: ICD-10-CM

## 2024-07-30 DIAGNOSIS — F70 MILD INTELLECTUAL DISABILITY: ICD-10-CM

## 2024-07-30 PROCEDURE — 99214 OFFICE O/P EST MOD 30 MIN: CPT | Performed by: INTERNAL MEDICINE

## 2024-07-30 RX ORDER — LITHIUM CARBONATE 300 MG/1
300 CAPSULE ORAL DAILY
COMMUNITY

## 2024-07-30 RX ORDER — CLOBETASOL PROPIONATE 0.5 MG/G
OINTMENT TOPICAL
Qty: 60 G | Refills: 3 | Status: SHIPPED | OUTPATIENT
Start: 2024-07-30

## 2024-07-30 RX ORDER — FLUTICASONE PROPIONATE 50 MCG
1 SPRAY, SUSPENSION (ML) NASAL DAILY
Qty: 32 G | Refills: 1 | Status: SHIPPED | OUTPATIENT
Start: 2024-07-30

## 2024-07-30 SDOH — ECONOMIC STABILITY: FOOD INSECURITY: WITHIN THE PAST 12 MONTHS, THE FOOD YOU BOUGHT JUST DIDN'T LAST AND YOU DIDN'T HAVE MONEY TO GET MORE.: PATIENT DECLINED

## 2024-07-30 SDOH — ECONOMIC STABILITY: FOOD INSECURITY: WITHIN THE PAST 12 MONTHS, YOU WORRIED THAT YOUR FOOD WOULD RUN OUT BEFORE YOU GOT MONEY TO BUY MORE.: PATIENT DECLINED

## 2024-07-30 SDOH — ECONOMIC STABILITY: HOUSING INSECURITY
IN THE LAST 12 MONTHS, WAS THERE A TIME WHEN YOU DID NOT HAVE A STEADY PLACE TO SLEEP OR SLEPT IN A SHELTER (INCLUDING NOW)?: PATIENT DECLINED

## 2024-07-30 SDOH — ECONOMIC STABILITY: INCOME INSECURITY: HOW HARD IS IT FOR YOU TO PAY FOR THE VERY BASICS LIKE FOOD, HOUSING, MEDICAL CARE, AND HEATING?: PATIENT DECLINED

## 2024-07-30 ASSESSMENT — PATIENT HEALTH QUESTIONNAIRE - PHQ9: DEPRESSION UNABLE TO ASSESS: FUNCTIONAL CAPACITY MOTIVATION LIMITS ACCURACY

## 2024-07-30 NOTE — PROGRESS NOTES
\"Have you been to the ER, urgent care clinic since your last visit?  Hospitalized since your last visit?\"    NO    “Have you seen or consulted any other health care providers outside of [facility] since your last visit?”    NO            Click Here for Release of Records Request  
(SYNTHROID) 112 MCG tablet Take 1 tablet by mouth daily    Cholecalciferol (VITAMIN D3) 50 MCG (2000 UT) TABS TAKE 1 TABLET BY MOUTH DAILY FOR VITAMIN (Patient not taking: Reported on 7/30/2024)    fluticasone (FLONASE) 50 MCG/ACT nasal spray 2 sprays by Nasal route daily (Patient not taking: Reported on 7/30/2024)    montelukast (SINGULAIR) 10 MG tablet Take 1 tablet by mouth nightly (Patient not taking: Reported on 7/30/2024)    ketoconazole (NIZORAL) 2 % cream Apply topically daily (Patient not taking: Reported on 7/30/2024)    acetaminophen (TYLENOL) 500 MG tablet Take 1 tablet by mouth every 6 hours as needed for Pain or Fever (Patient not taking: Reported on 7/30/2024)     No current facility-administered medications for this visit.     Health Maintenance   Topic Date Due    HIV screen  Never done    COVID-19 Vaccine (4 - 2023-24 season) 09/01/2023    Depression Monitoring  04/14/2024    Flu vaccine (1) 08/01/2024    Hepatitis B vaccine (1 of 3 - 3-dose series) 10/20/2024 (Originally 1980)    Pneumococcal 0-64 years Vaccine (1 of 2 - PCV) 01/01/2030 (Originally [birth year]/3/1988)    DTaP/Tdap/Td vaccine (3 - Td or Tdap) 06/08/2026    Lipids  07/14/2027    Hepatitis A vaccine  Aged Out    Hib vaccine  Aged Out    HPV vaccine  Aged Out    Polio vaccine  Aged Out    Meningococcal (ACWY) vaccine  Aged Out    Varicella vaccine  Discontinued    Hepatitis C screen  Discontinued     Immunization History   Administered Date(s) Administered    COVID-19, MODERNA BLUE border, Primary or Immunocompromised, (age 12y+), IM, 100 mcg/0.5mL 01/22/2021, 03/02/2021, 11/18/2021    Influenza Virus Vaccine 11/13/2019, 10/13/2020, 10/18/2021, 10/12/2022    Influenza, FLUARIX, FLULAVAL, FLUZONE (age 6 mo+) AND AFLURIA, (age 3 y+), PF, 0.5mL 11/13/2019, 10/13/2020, 10/18/2021, 10/12/2022    Influenza, FLUCELVAX, (age 6 mo+), MDCK, PF, 0.5mL 10/20/2023    TDaP, ADACEL (age 10y-64y), BOOSTRIX (age 10y+), IM, 0.5mL 06/08/2016     No LMP for

## 2024-07-31 LAB
25(OH)D3+25(OH)D2 SERPL-MCNC: 34.4 NG/ML (ref 30–100)
ALBUMIN SERPL-MCNC: 4.7 G/DL (ref 4.1–5.1)
ALP SERPL-CCNC: 76 IU/L (ref 44–121)
ALT SERPL-CCNC: 11 IU/L (ref 0–44)
AST SERPL-CCNC: 16 IU/L (ref 0–40)
BILIRUB SERPL-MCNC: 0.3 MG/DL (ref 0–1.2)
BUN SERPL-MCNC: 10 MG/DL (ref 6–24)
BUN/CREAT SERPL: 14 (ref 9–20)
CALCIUM SERPL-MCNC: 9.9 MG/DL (ref 8.7–10.2)
CHLORIDE SERPL-SCNC: 107 MMOL/L (ref 96–106)
CO2 SERPL-SCNC: 22 MMOL/L (ref 20–29)
CREAT SERPL-MCNC: 0.74 MG/DL (ref 0.76–1.27)
EGFRCR SERPLBLD CKD-EPI 2021: 116 ML/MIN/1.73
GLOBULIN SER CALC-MCNC: 2.3 G/DL (ref 1.5–4.5)
GLUCOSE SERPL-MCNC: 95 MG/DL (ref 70–99)
POTASSIUM SERPL-SCNC: 4.3 MMOL/L (ref 3.5–5.2)
PROT SERPL-MCNC: 7 G/DL (ref 6–8.5)
SODIUM SERPL-SCNC: 142 MMOL/L (ref 134–144)
T4 FREE SERPL-MCNC: 0.69 NG/DL (ref 0.82–1.77)
TSH SERPL DL<=0.005 MIU/L-ACNC: 4.66 UIU/ML (ref 0.45–4.5)

## 2024-08-01 DIAGNOSIS — E03.9 ACQUIRED HYPOTHYROIDISM: Primary | ICD-10-CM

## 2024-08-01 RX ORDER — LEVOTHYROXINE SODIUM 112 UG/1
112 TABLET ORAL DAILY
Qty: 90 TABLET | Refills: 1 | Status: SHIPPED | OUTPATIENT
Start: 2024-08-01

## 2024-08-13 DIAGNOSIS — K59.04 CHRONIC IDIOPATHIC CONSTIPATION: ICD-10-CM

## 2024-08-13 RX ORDER — DOCUSATE SODIUM 100 MG/1
CAPSULE, LIQUID FILLED ORAL
Qty: 30 CAPSULE | Refills: 5 | Status: SHIPPED | OUTPATIENT
Start: 2024-08-13

## 2024-08-30 ENCOUNTER — OFFICE VISIT (OUTPATIENT)
Dept: PRIMARY CARE CLINIC | Facility: CLINIC | Age: 44
End: 2024-08-30
Payer: MEDICAID

## 2024-08-30 VITALS
HEIGHT: 69 IN | DIASTOLIC BLOOD PRESSURE: 76 MMHG | WEIGHT: 156 LBS | TEMPERATURE: 97.8 F | RESPIRATION RATE: 18 BRPM | BODY MASS INDEX: 23.11 KG/M2 | SYSTOLIC BLOOD PRESSURE: 120 MMHG | OXYGEN SATURATION: 98 % | HEART RATE: 87 BPM

## 2024-08-30 DIAGNOSIS — Z11.7 ENCOUNTER FOR TESTING FOR LATENT TUBERCULOSIS: ICD-10-CM

## 2024-08-30 DIAGNOSIS — R30.0 DYSURIA: ICD-10-CM

## 2024-08-30 DIAGNOSIS — E03.9 ACQUIRED HYPOTHYROIDISM: ICD-10-CM

## 2024-08-30 DIAGNOSIS — Z00.00 ANNUAL PHYSICAL EXAM: Primary | ICD-10-CM

## 2024-08-30 PROCEDURE — 99396 PREV VISIT EST AGE 40-64: CPT | Performed by: INTERNAL MEDICINE

## 2024-08-30 ASSESSMENT — PATIENT HEALTH QUESTIONNAIRE - PHQ9
3. TROUBLE FALLING OR STAYING ASLEEP: NOT AT ALL
SUM OF ALL RESPONSES TO PHQ9 QUESTIONS 1 & 2: 0
6. FEELING BAD ABOUT YOURSELF - OR THAT YOU ARE A FAILURE OR HAVE LET YOURSELF OR YOUR FAMILY DOWN: NOT AT ALL
SUM OF ALL RESPONSES TO PHQ QUESTIONS 1-9: 0
9. THOUGHTS THAT YOU WOULD BE BETTER OFF DEAD, OR OF HURTING YOURSELF: NOT AT ALL
2. FEELING DOWN, DEPRESSED OR HOPELESS: NOT AT ALL
SUM OF ALL RESPONSES TO PHQ QUESTIONS 1-9: 0
7. TROUBLE CONCENTRATING ON THINGS, SUCH AS READING THE NEWSPAPER OR WATCHING TELEVISION: NOT AT ALL
SUM OF ALL RESPONSES TO PHQ QUESTIONS 1-9: 0
SUM OF ALL RESPONSES TO PHQ QUESTIONS 1-9: 0
10. IF YOU CHECKED OFF ANY PROBLEMS, HOW DIFFICULT HAVE THESE PROBLEMS MADE IT FOR YOU TO DO YOUR WORK, TAKE CARE OF THINGS AT HOME, OR GET ALONG WITH OTHER PEOPLE: NOT DIFFICULT AT ALL
8. MOVING OR SPEAKING SO SLOWLY THAT OTHER PEOPLE COULD HAVE NOTICED. OR THE OPPOSITE, BEING SO FIGETY OR RESTLESS THAT YOU HAVE BEEN MOVING AROUND A LOT MORE THAN USUAL: NOT AT ALL
5. POOR APPETITE OR OVEREATING: NOT AT ALL
1. LITTLE INTEREST OR PLEASURE IN DOING THINGS: NOT AT ALL
4. FEELING TIRED OR HAVING LITTLE ENERGY: NOT AT ALL

## 2024-08-30 NOTE — PROGRESS NOTES
Health Decision Maker has been checked with the patient      AI form was signed    Chief Complaint   Patient presents with    Annual Exam       \"Have you been to the ER, urgent care clinic since your last visit?  Hospitalized since your last visit?\"    NO    “Have you seen or consulted any other health care providers outside of [facility] since your last visit?”    NO      There were no vitals filed for this visit.   Depression: Not at risk (4/14/2023)    PHQ-2     PHQ-2 Score: 0              Click Here for Release of Records Request    Chart reviewed: immunizations are documented.   Immunization History   Administered Date(s) Administered    COVID-19, MODERNA BLUE border, Primary or Immunocompromised, (age 12y+), IM, 100 mcg/0.5mL 01/22/2021, 03/02/2021, 11/18/2021    Influenza Virus Vaccine 11/13/2019, 10/13/2020, 10/18/2021, 10/12/2022    Influenza, FLUARIX, FLULAVAL, FLUZONE (age 6 mo+) and AFLURIA, (age 3 y+), Quadv PF, 0.5mL 11/13/2019, 10/13/2020, 10/18/2021, 10/12/2022    Influenza, FLUCELVAX, (age 6 mo+), MDCK, Quadv PF, 0.5mL 10/20/2023    TDaP, ADACEL (age 10y-64y), BOOSTRIX (age 10y+), IM, 0.5mL 06/08/2016

## 2024-08-30 NOTE — PROGRESS NOTES
Josue Bianchi is a 44 y.o. male and presents with     Chief Complaint   Patient presents with    Annual Exam       History of Present Illness  The patient presents for evaluation of multiple medical concerns. He is accompanied by a caregiver.    He reports feeling well overall, with no significant health complaints. He has a history of urinary tract infection (UTI) from 04/2024.    He has been experiencing occasional sneezing and mild coughing.    He is a smoker, consuming approximately 5 cigarettes daily. Despite previous advice to quit smoking, he expresses no current desire to do so. He also mentions that he does not feel fatigued after smoking.         Past Medical History:   Diagnosis Date    Depressive disorder     Hypothyroid     Schizoaffective disorder (HCC)     Uses hearing aid      No past surgical history on file.  Current Outpatient Medications   Medication Sig    docusate sodium (COLACE) 100 MG capsule TAKE 1 CAPSULE BY MOUTH DAILY FOR CONSTIPATION    levothyroxine (SYNTHROID) 112 MCG tablet Take 1 tablet by mouth daily    lithium 300 MG capsule Take 1 capsule by mouth daily TAKE 2 CAPSULE BY MOUTH IN THE [facility]    clobetasol (TEMOVATE) 0.05 % ointment Apply topically 2 times daily.    fluticasone (FLONASE) 50 MCG/ACT nasal spray 1 spray by Each Nostril route daily    [employer] Sunscreen SPF 30 LOTN Apply 5 mLs topically 2 times daily as needed (prn)    fluticasone-salmeterol (ADVAIR DISKUS) 500-50 MCG/ACT AEPB diskus inhaler Inhale 1 puff into the lungs 2 times daily NEEDS APPOINTMENT WITH PROVIDER TO GET FUTURE REFILLS.    albuterol (PROVENTIL) (2.5 MG/3ML) 0.083% nebulizer solution Take 3 mLs by nebulization every 6 hours as needed for Wheezing    LITHIUM PO Take 150 mg by mouth daily    gabapentin (NEURONTIN) 400 MG capsule Take 2 capsules by mouth 2 times daily.    lithium 300 MG capsule Take 1 capsule by mouth One tab po in the morning and 2 tab po at HS    QUEtiapine (SEROQUEL) 400 MG tablet Take

## 2024-08-31 LAB
APPEARANCE UR: CLEAR
BILIRUB UR QL STRIP: NEGATIVE
COLOR UR: YELLOW
GLUCOSE UR QL STRIP: NEGATIVE
HGB UR QL STRIP: NEGATIVE
KETONES UR QL STRIP: NEGATIVE
LEUKOCYTE ESTERASE UR QL STRIP: NEGATIVE
NITRITE UR QL STRIP: NEGATIVE
PH UR STRIP: 7.5 [PH] (ref 5–7.5)
PROT UR QL STRIP: NEGATIVE
SP GR UR STRIP: 1.01 (ref 1–1.03)
UROBILINOGEN UR STRIP-MCNC: 0.2 MG/DL (ref 0.2–1)

## 2024-09-02 LAB — BACTERIA UR CULT: NORMAL

## 2024-09-05 LAB
GAMMA INTERFERON BACKGROUND BLD IA-ACNC: 0.03 IU/ML
M TB IFN-G BLD-IMP: NEGATIVE
M TB IFN-G CD4+ BCKGRND COR BLD-ACNC: 0.04 IU/ML
M TB IFN-G CD4+CD8+ BCKGRND COR BLD-ACNC: 0.04 IU/ML
MITOGEN IGNF BCKGRD COR BLD-ACNC: >10 IU/ML
SERVICE CMNT-IMP: NORMAL

## 2024-10-08 ENCOUNTER — TELEPHONE (OUTPATIENT)
Dept: PRIMARY CARE CLINIC | Facility: CLINIC | Age: 44
End: 2024-10-08

## 2024-10-08 NOTE — TELEPHONE ENCOUNTER
[facility] home health called they received orders for the patient, patient admitted into [facility] for stroke and hospital follow up was scheduled for 10/24.  They need Verbal order from provider to follow for home health.       Please Call [phone] for the verbal order to follow for home care

## 2024-10-10 ENCOUNTER — TELEPHONE (OUTPATIENT)
Dept: PRIMARY CARE CLINIC | Facility: CLINIC | Age: 44
End: 2024-10-10

## 2024-10-10 NOTE — TELEPHONE ENCOUNTER
Sissy from [facility] is calling because there are opening the pt up to home health but only for eval. If you have any questions you can call her at   [phone]

## 2024-10-14 RX ORDER — CHOLECALCIFEROL (VITAMIN D3) 50 MCG
TABLET ORAL
Qty: 30 TABLET | Refills: 11 | Status: SHIPPED | OUTPATIENT
Start: 2024-10-14

## 2024-10-14 NOTE — TELEPHONE ENCOUNTER
PCP: Gucci Saenz MD    Last Visit 8/30/2024   Future Appointments   Date Time Provider Department Center   10/24/2024 11:30 AM Gucci Saenz MD [facility]   11/21/2024  9:45 AM Gucci Saenz MD [facility]       Requested Prescriptions     Pending Prescriptions Disp Refills    Cholecalciferol (VITAMIN D3) 50 MCG (2000 UT) TABS [Pharmacy Med Name: VITAMIN D3 50 MCG TABLET] 30 tablet 11     Sig: TAKE 1 TABLET BY MOUTH DAILY FOR VITAMIN         Other Comments: Last Refill   06/13/24

## 2024-10-17 ENCOUNTER — TELEPHONE (OUTPATIENT)
Dept: PRIMARY CARE CLINIC | Facility: CLINIC | Age: 44
End: 2024-10-17

## 2024-10-17 NOTE — TELEPHONE ENCOUNTER
Sharron from [facility] saw the patient today and said he does not require OT Home Health.

## 2024-10-17 NOTE — TELEPHONE ENCOUNTER
Deann Carpenter from [facility] is calling wanting to leave a message to Dr. Saenz     He said he saw the pt for home physical therapy evaluation after he was hospitalized for having a stroke    Deann would like to work together with Dr. BOND so he can see him once a week for 8 weeks     Deann Lennon call back # is  [phone]

## 2024-10-24 ENCOUNTER — OFFICE VISIT (OUTPATIENT)
Dept: PRIMARY CARE CLINIC | Facility: CLINIC | Age: 44
End: 2024-10-24
Payer: MEDICAID

## 2024-10-24 VITALS
SYSTOLIC BLOOD PRESSURE: 111 MMHG | HEART RATE: 69 BPM | OXYGEN SATURATION: 95 % | RESPIRATION RATE: 18 BRPM | DIASTOLIC BLOOD PRESSURE: 73 MMHG | HEIGHT: 69 IN | BODY MASS INDEX: 22.45 KG/M2 | WEIGHT: 151.6 LBS | TEMPERATURE: 97.8 F

## 2024-10-24 DIAGNOSIS — I63.89 BRAINSTEM INFARCT, ACUTE (HCC): Primary | ICD-10-CM

## 2024-10-24 DIAGNOSIS — Q21.12 PFO (PATENT FORAMEN OVALE): ICD-10-CM

## 2024-10-24 PROCEDURE — 99214 OFFICE O/P EST MOD 30 MIN: CPT | Performed by: INTERNAL MEDICINE

## 2024-10-24 NOTE — PROGRESS NOTES
\"Have you been to the ER, urgent care clinic since your last visit?  Hospitalized since your last visit?\"    YES - When: approximately 3 days ago.  Where and Why: [facility].    “Have you seen or consulted any other health care providers outside our system since your last visit?”    NO

## 2024-10-24 NOTE — PROGRESS NOTES
Josue Bianchi is a 44 y.o. male and presents with     No chief complaint on file.      History of Present Illness  The patient presents for a follow-up visit. He is accompanied by an adult female.    He was hospitalized from 10/05/2024 to 10/08/2024 due to a fall resulting in a knee injury. During this incident, he was found unresponsive on the floor, having urinated on himself and unable to stand. He complained of a headache, prompting an ambulance call. Initial scans suggested a stroke, leading to further tests including an MRI. These tests revealed a congenital heart defect, necessitating consultations with a cardiologist and neurologist.    Since his discharge, he has been using a cane for mobility and receiving home-based speech and physical therapy. 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 He has expressed a desire to continue working.    He has a history of being legally blind, with a vision of 20/30 years ago, which may have deteriorated over time. He has no teeth, so his food is cut into small pieces to prevent choking.         Past Medical History:   Diagnosis Date    Depressive disorder     Hypothyroid     Schizoaffective disorder (HCC)     Uses hearing aid      No past surgical history on file.  Current Outpatient Medications   Medication Sig    Cholecalciferol (VITAMIN D3) 50 MCG (2000 UT) TABS TAKE 1 TABLET BY MOUTH DAILY FOR VITAMIN    docusate sodium (COLACE) 100 MG capsule TAKE 1 CAPSULE BY MOUTH DAILY FOR CONSTIPATION    levothyroxine (SYNTHROID) 112 MCG tablet Take 1 tablet by mouth daily    lithium 300 MG capsule Take 1 capsule by mouth daily TAKE 2 CAPSULE BY MOUTH IN THE [facility]    clobetasol (TEMOVATE) 0.05 % ointment Apply topically 2 times daily.    fluticasone (FLONASE) 50 MCG/ACT nasal spray 1 spray by Each Nostril route daily    [employer] Sunscreen SPF 30 LOTN

## 2025-01-16 DIAGNOSIS — E03.9 ACQUIRED HYPOTHYROIDISM: ICD-10-CM

## 2025-01-16 RX ORDER — LEVOTHYROXINE SODIUM 112 UG/1
112 TABLET ORAL DAILY
Qty: 90 TABLET | Refills: 0 | Status: SHIPPED | OUTPATIENT
Start: 2025-01-16

## 2025-01-28 ENCOUNTER — TELEPHONE (OUTPATIENT)
Dept: PRIMARY CARE CLINIC | Facility: CLINIC | Age: 45
End: 2025-01-28

## 2025-01-28 NOTE — TELEPHONE ENCOUNTER
Mrs. Gotti said [facility] pharmacy said they are having trouble getting the patients Asprin 81 mg prescriptions refilled. Ana María said he is not out of medicine yet

## 2025-01-28 NOTE — TELEPHONE ENCOUNTER
Staff at [facility] stated that the issue with the 81  mg of Asprin has already been taken care of.

## 2025-01-30 ENCOUNTER — TELEPHONE (OUTPATIENT)
Dept: PRIMARY CARE CLINIC | Facility: CLINIC | Age: 45
End: 2025-01-30

## 2025-01-30 RX ORDER — ASPIRIN 81 MG/1
81 TABLET, CHEWABLE ORAL DAILY
COMMUNITY
End: 2025-01-30 | Stop reason: SDUPTHER

## 2025-01-30 RX ORDER — ASPIRIN 81 MG/1
81 TABLET, CHEWABLE ORAL DAILY
Qty: 30 TABLET | Refills: 0 | Status: SHIPPED | OUTPATIENT
Start: 2025-01-30

## 2025-01-30 NOTE — TELEPHONE ENCOUNTER
Spoke to Ms gotti who states that the pharmacy has not received a refill of the 81 mg of Aspirin, this writer then stated to Ms. Gotti that I spoke with her and she told me that the 81 mg of Aspirin was already taken care of. Ms. Gotti then states that she only said that because the pt has the medication currently to take but a new RX for the medication will need  to be sent to the pharmacy. This writer then told Ms. Gotti I will send medication  request Dr. Saenz.

## 2025-01-30 NOTE — TELEPHONE ENCOUNTER
PCP: Gucci Saenz MD    Last Visit 10/24/2024   Future Appointments   Date Time Provider Department Center   2/19/2025 11:30 AM Gucci Saenz MD [facility]       Requested Prescriptions     Pending Prescriptions Disp Refills    aspirin 81 MG chewable tablet 30 tablet      Sig: Take 1 tablet by mouth daily         Other Comments: Last Refill   N/a

## 2025-02-13 DIAGNOSIS — K59.04 CHRONIC IDIOPATHIC CONSTIPATION: ICD-10-CM

## 2025-02-13 RX ORDER — DOCUSATE SODIUM 100 MG/1
CAPSULE, LIQUID FILLED ORAL
Qty: 30 CAPSULE | Refills: 12 | Status: SHIPPED | OUTPATIENT
Start: 2025-02-13

## 2025-02-13 RX ORDER — ASPIRIN 81 MG
TABLET,CHEWABLE ORAL
Qty: 30 TABLET | Refills: 12 | Status: SHIPPED | OUTPATIENT
Start: 2025-02-13

## 2025-02-13 NOTE — TELEPHONE ENCOUNTER
PCP: Gucci Saenz MD    Last Visit 10/24/2024   Future Appointments   Date Time Provider Department Center   2/19/2025 11:30 AM Gucci Saenz MD [facility]       Requested Prescriptions     Pending Prescriptions Disp Refills    docusate sodium (COLACE) 100 MG capsule [Pharmacy Med Name: DOCUSATE SODIUM 100 MG SOFTGEL] 30 capsule 12     Sig: TAKE 1 CAPSULE BY MOUTH DAILY FOR CONSTIPATION    ASPIRIN LOW DOSE 81 MG chewable tablet [Pharmacy Med Name: ASPIRIN 81 MG CHEWABLE TABLET] 30 tablet 12     Sig: CHEW AND SWALLOW (1) TABLET DAILY.         Other Comments: Last Refill   08/13/24

## 2025-02-18 RX ORDER — IBUPROFEN 200 MG
CAPSULE ORAL
Qty: 28.4 G | Refills: 12 | Status: SHIPPED | OUTPATIENT
Start: 2025-02-18

## 2025-02-18 NOTE — TELEPHONE ENCOUNTER
PCP: Gucci Saenz MD    Last Visit 10/24/2024   Future Appointments   Date Time Provider Department Center   2/19/2025 11:30 AM Gucci Saenz MD [facility]       Requested Prescriptions     Pending Prescriptions Disp Refills    neomycin-bacitracin-polymyxin (NEOSPORIN) 3.5-400-5000 OINT ointment [Pharmacy Med Name: TRIPLE ANTIBIOTIC OINTMENT] 28.4 g 12     Sig: APPLY TO AFFECTED AREA 3 TIMES DAILY         Other Comments: Last Refill   10/23/24

## 2025-03-28 DIAGNOSIS — J45.40 MODERATE PERSISTENT ASTHMA, UNCOMPLICATED: ICD-10-CM

## 2025-03-29 RX ORDER — ALBUTEROL SULFATE 0.83 MG/ML
SOLUTION RESPIRATORY (INHALATION)
Qty: 150 ML | Refills: 0 | Status: SHIPPED | OUTPATIENT
Start: 2025-03-29

## 2025-05-13 DIAGNOSIS — E03.9 ACQUIRED HYPOTHYROIDISM: ICD-10-CM

## 2025-05-13 RX ORDER — LEVOTHYROXINE SODIUM 112 UG/1
112 TABLET ORAL DAILY
Qty: 30 TABLET | Refills: 0 | Status: SHIPPED | OUTPATIENT
Start: 2025-05-13

## 2025-05-13 NOTE — TELEPHONE ENCOUNTER
PCP: Gucci Saenz MD    Last Visit 10/24/2024   No future appointments.    Requested Prescriptions     Pending Prescriptions Disp Refills    levothyroxine (SYNTHROID) 112 MCG tablet [Pharmacy Med Name: LEVOTHYROXINE 112 MCG TABLET] 30 tablet 0     Sig: TAKE 1 TABLET BY MOUTH EVERY DAY         Other Comments: Last Refill 01/16/25

## 2025-06-16 DIAGNOSIS — E03.9 ACQUIRED HYPOTHYROIDISM: ICD-10-CM

## 2025-06-16 RX ORDER — LEVOTHYROXINE SODIUM 112 UG/1
112 TABLET ORAL DAILY
Qty: 30 TABLET | Refills: 0 | Status: SHIPPED | OUTPATIENT
Start: 2025-06-16

## 2025-07-01 RX ORDER — FLUTICASONE PROPIONATE 50 MCG
SPRAY, SUSPENSION (ML) NASAL
Qty: 16 G | Refills: 3 | Status: SHIPPED | OUTPATIENT
Start: 2025-07-01

## 2025-07-01 RX ORDER — CLOBETASOL PROPIONATE 0.5 MG/G
OINTMENT TOPICAL
Qty: 60 G | Refills: 3 | Status: SHIPPED | OUTPATIENT
Start: 2025-07-01

## 2025-07-01 NOTE — TELEPHONE ENCOUNTER
PCP: Gucci Saenz MD    Last Visit 10/24/2024   Future Appointments   Date Time Provider Department Center   7/23/2025 11:30 AM Gucci Saenz MD [facility]   9/3/2025  1:30 PM Gucci Saenz MD [facility] DE[facility]       Requested Prescriptions     Pending Prescriptions Disp Refills    clobetasol (TEMOVATE) 0.05 % ointment [Pharmacy Med Name: CLOBETASOL 0.05% OINTMENT] 60 g 3     Sig: APPLY LOCALLY TO AFFECTED AREAS TWICE A DAY    fluticasone (FLONASE) 50 MCG/ACT nasal spray [Pharmacy Med Name: FLUTICASONE PROP 50 MCG SPRAY] 16 g 3     Sig: BLOW NOSE: THEN USE 1 SPRAY EACH NOSTRIL DAILY FOR ALLERGY         Other Comments: Last Refill

## 2025-07-15 DIAGNOSIS — E03.9 ACQUIRED HYPOTHYROIDISM: ICD-10-CM

## 2025-07-15 RX ORDER — LEVOTHYROXINE SODIUM 112 UG/1
112 TABLET ORAL DAILY
Qty: 30 TABLET | Refills: 0 | Status: SHIPPED | OUTPATIENT
Start: 2025-07-15

## 2025-07-15 NOTE — TELEPHONE ENCOUNTER
PCP: Gucci Saenz MD    Last Visit 10/24/2024   Future Appointments   Date Time Provider Department Center   7/23/2025 11:30 AM Gucci Saenz MD [facility]   9/3/2025  1:30 PM Gucci Saenz MD [facility]       Requested Prescriptions     Pending Prescriptions Disp Refills    levothyroxine (SYNTHROID) 112 MCG tablet [Pharmacy Med Name: LEVOTHYROXINE 112 MCG TABLET] 30 tablet 12     Sig: TAKE 1 TABLET BY MOUTH EVERY DAY         Other Comments: Last Refill

## 2025-07-23 ENCOUNTER — OFFICE VISIT (OUTPATIENT)
Dept: PRIMARY CARE CLINIC | Facility: CLINIC | Age: 45
End: 2025-07-23
Payer: MEDICAID

## 2025-07-23 VITALS
DIASTOLIC BLOOD PRESSURE: 75 MMHG | TEMPERATURE: 97.5 F | WEIGHT: 167.4 LBS | SYSTOLIC BLOOD PRESSURE: 111 MMHG | OXYGEN SATURATION: 97 % | HEIGHT: 69 IN | BODY MASS INDEX: 24.79 KG/M2 | HEART RATE: 90 BPM | RESPIRATION RATE: 12 BRPM

## 2025-07-23 DIAGNOSIS — E55.9 VITAMIN D DEFICIENCY: ICD-10-CM

## 2025-07-23 DIAGNOSIS — Z00.00 ANNUAL PHYSICAL EXAM: ICD-10-CM

## 2025-07-23 DIAGNOSIS — Q21.12 PFO (PATENT FORAMEN OVALE): ICD-10-CM

## 2025-07-23 DIAGNOSIS — J45.40 MODERATE PERSISTENT ASTHMA, UNCOMPLICATED: ICD-10-CM

## 2025-07-23 DIAGNOSIS — E03.9 ACQUIRED HYPOTHYROIDISM: ICD-10-CM

## 2025-07-23 DIAGNOSIS — Q21.10 ASD (ATRIAL SEPTAL DEFECT): ICD-10-CM

## 2025-07-23 DIAGNOSIS — I63.9 FOCAL INFARCTION OF BRAIN (HCC): ICD-10-CM

## 2025-07-23 DIAGNOSIS — K59.04 CHRONIC IDIOPATHIC CONSTIPATION: ICD-10-CM

## 2025-07-23 DIAGNOSIS — Z00.00 ANNUAL PHYSICAL EXAM: Primary | ICD-10-CM

## 2025-07-23 DIAGNOSIS — F25.9 SCHIZOAFFECTIVE DISORDER, UNSPECIFIED TYPE (HCC): ICD-10-CM

## 2025-07-23 DIAGNOSIS — I63.89 BRAINSTEM INFARCT, ACUTE (HCC): ICD-10-CM

## 2025-07-23 DIAGNOSIS — F33.42 MAJOR DEPRESSIVE DISORDER, RECURRENT, IN FULL REMISSION: ICD-10-CM

## 2025-07-23 PROCEDURE — 99396 PREV VISIT EST AGE 40-64: CPT | Performed by: INTERNAL MEDICINE

## 2025-07-23 RX ORDER — FLUTICASONE PROPIONATE AND SALMETEROL 500; 50 UG/1; UG/1
1 POWDER RESPIRATORY (INHALATION) 2 TIMES DAILY
Qty: 60 EACH | Refills: 3 | Status: SHIPPED | OUTPATIENT
Start: 2025-07-23

## 2025-07-23 RX ORDER — KETOCONAZOLE 20 MG/G
CREAM TOPICAL
COMMUNITY
Start: 2025-06-08 | End: 2025-07-23 | Stop reason: SDUPTHER

## 2025-07-23 RX ORDER — KETOCONAZOLE 20 MG/G
CREAM TOPICAL
Qty: 30 G | Refills: 3 | Status: SHIPPED | OUTPATIENT
Start: 2025-07-23

## 2025-07-23 RX ORDER — CLOPIDOGREL BISULFATE 75 MG/1
75 TABLET ORAL DAILY
COMMUNITY
Start: 2025-04-29 | End: 2026-04-29

## 2025-07-23 RX ORDER — CLONAZEPAM 0.5 MG/1
0.5 TABLET ORAL DAILY
COMMUNITY
Start: 2025-03-25

## 2025-07-23 RX ORDER — TERBINAFINE HYDROCHLORIDE 250 MG/1
TABLET ORAL
COMMUNITY
Start: 2025-07-01

## 2025-07-23 RX ORDER — HYDROXYZINE PAMOATE 25 MG/1
CAPSULE ORAL
COMMUNITY
Start: 2025-07-01

## 2025-07-23 SDOH — ECONOMIC STABILITY: FOOD INSECURITY: WITHIN THE PAST 12 MONTHS, YOU WORRIED THAT YOUR FOOD WOULD RUN OUT BEFORE YOU GOT MONEY TO BUY MORE.: NEVER TRUE

## 2025-07-23 SDOH — ECONOMIC STABILITY: FOOD INSECURITY: WITHIN THE PAST 12 MONTHS, THE FOOD YOU BOUGHT JUST DIDN'T LAST AND YOU DIDN'T HAVE MONEY TO GET MORE.: NEVER TRUE

## 2025-07-23 ASSESSMENT — PATIENT HEALTH QUESTIONNAIRE - PHQ9
1. LITTLE INTEREST OR PLEASURE IN DOING THINGS: NOT AT ALL
SUM OF ALL RESPONSES TO PHQ QUESTIONS 1-9: 0
2. FEELING DOWN, DEPRESSED OR HOPELESS: NOT AT ALL
SUM OF ALL RESPONSES TO PHQ QUESTIONS 1-9: 0

## 2025-07-23 NOTE — TELEPHONE ENCOUNTER
Rohit Cheema called to see if refills can be sent to [facility] on [address]:    Advair Inhaler  Sunscreen ([facility] Sunscreen SPF 30 Lotion)  Ketoconazole 2% Cream    *Patient had an appt with Dr VARUN rios

## 2025-07-23 NOTE — PROGRESS NOTES
Josue Bianchi is a 45 y.o. male and presents with     Chief Complaint   Patient presents with    Rash     Ongoing-     Medication Refill     Sunscreen      Annual Exam       History of Present Illness  The patient presents for evaluation of diarrhea, eczema, and stroke.    He reports overall good health with no respiratory issues. Bowel movements are irregular, with episodes of diarrhea yesterday and last night.    He uses a catheter for urination and is currently on Plavix, prescribed by his cardiologist.    He was diagnosed with a small stroke and underwent surgery in 04/2025 for a patent foramen ovale.    He initially saw a primary care physician for rashes on his head, hand, and groin, diagnosed as eczema, and was prescribed terbinafine.    Social History:  Consumes tea and coffee.    PAST SURGICAL HISTORY:  Had Patent foramen ovale surgery in 04/2025 at [facility]       Past Medical History:   Diagnosis Date    Depressive disorder     Hypothyroid     Schizoaffective disorder (HCC)     Uses hearing aid      No past surgical history on file.  Current Outpatient Medications   Medication Sig    terbinafine (LAMISIL) 250 MG tablet     clopidogrel (PLAVIX) 75 MG tablet Take 1 tablet by mouth daily    clonazePAM (KLONOPIN) 0.5 MG tablet Take 1 tablet by mouth daily.    hydrOXYzine pamoate (VISTARIL) 25 MG capsule     ketoconazole (NIZORAL) 2 % cream APPLY TO AFFECTED AREA EVERY DAY    levothyroxine (SYNTHROID) 112 MCG tablet TAKE 1 TABLET BY MOUTH EVERY DAY    clobetasol (TEMOVATE) 0.05 % ointment APPLY LOCALLY TO AFFECTED AREAS TWICE A DAY    fluticasone (FLONASE) 50 MCG/ACT nasal spray BLOW NOSE: THEN USE 1 SPRAY EACH NOSTRIL DAILY FOR ALLERGY    albuterol (PROVENTIL) (2.5 MG/3ML) 0.083% nebulizer solution INHALE 1 VIAL (3ML) VIA NEBULIZER EVERY 6 HOURS AS NEEDED FOR WHEEZING    neomycin-bacitracin-polymyxin (NEOSPORIN) 3.5-400-5000 OINT ointment APPLY TO AFFECTED AREA 3 TIMES DAILY    docusate sodium (COLACE) 100 MG

## 2025-07-23 NOTE — TELEPHONE ENCOUNTER
PCP: Gucci Saenz MD    Last Visit 7/23/2025   Future Appointments   Date Time Provider Department Center   1/26/2026 11:30 AM Gucci Saenz MD [facility]       Requested Prescriptions     Pending Prescriptions Disp Refills    [employer] Sunscreen SPF 30 LOTN 237 mL 0     Sig: Apply 5 mLs topically 2 times daily as needed (prn)    fluticasone-salmeterol (ADVAIR DISKUS) 500-50 MCG/ACT AEPB diskus inhaler 60 each 0     Sig: Inhale 1 puff into the lungs 2 times daily NEEDS APPOINTMENT WITH PROVIDER TO GET FUTURE REFILLS.    ketoconazole (NIZORAL) 2 % cream 30 g      Sig: Apply topically daily.         Other Comments: Last Refill   Advair 04/24/24  Sunscreen 06/18/24  Nizoral 06/08/25

## 2025-07-24 ENCOUNTER — RESULTS FOLLOW-UP (OUTPATIENT)
Dept: PRIMARY CARE CLINIC | Facility: CLINIC | Age: 45
End: 2025-07-24

## 2025-07-24 LAB
25(OH)D3+25(OH)D2 SERPL-MCNC: 37.6 NG/ML (ref 30–100)
ALBUMIN SERPL-MCNC: 4.5 G/DL (ref 4.1–5.1)
ALP SERPL-CCNC: 94 IU/L (ref 44–121)
ALT SERPL-CCNC: 14 IU/L (ref 0–44)
AST SERPL-CCNC: 19 IU/L (ref 0–40)
BILIRUB SERPL-MCNC: 0.2 MG/DL (ref 0–1.2)
BUN SERPL-MCNC: 9 MG/DL (ref 6–24)
BUN/CREAT SERPL: 14 (ref 9–20)
CALCIUM SERPL-MCNC: 9.8 MG/DL (ref 8.7–10.2)
CHLORIDE SERPL-SCNC: 104 MMOL/L (ref 96–106)
CHOLEST SERPL-MCNC: 177 MG/DL (ref 100–199)
CO2 SERPL-SCNC: 16 MMOL/L (ref 20–29)
CREAT SERPL-MCNC: 0.65 MG/DL (ref 0.76–1.27)
EGFRCR SERPLBLD CKD-EPI 2021: 120 ML/MIN/1.73
ERYTHROCYTE [DISTWIDTH] IN BLOOD BY AUTOMATED COUNT: 13.7 % (ref 11.6–15.4)
EST. AVERAGE GLUCOSE BLD GHB EST-MCNC: 100 MG/DL
GLOBULIN SER CALC-MCNC: 2.5 G/DL (ref 1.5–4.5)
GLUCOSE SERPL-MCNC: 59 MG/DL (ref 70–99)
HBA1C MFR BLD: 5.1 % (ref 4.8–5.6)
HCT VFR BLD AUTO: 48.3 % (ref 37.5–51)
HDLC SERPL-MCNC: 52 MG/DL
HGB BLD-MCNC: 15.6 G/DL (ref 13–17.7)
LDLC SERPL CALC-MCNC: 103 MG/DL (ref 0–99)
MCH RBC QN AUTO: 29.3 PG (ref 26.6–33)
MCHC RBC AUTO-ENTMCNC: 32.3 G/DL (ref 31.5–35.7)
MCV RBC AUTO: 91 FL (ref 79–97)
PLATELET # BLD AUTO: 263 X10E3/UL (ref 150–450)
POTASSIUM SERPL-SCNC: 4.9 MMOL/L (ref 3.5–5.2)
PROT SERPL-MCNC: 7 G/DL (ref 6–8.5)
RBC # BLD AUTO: 5.33 X10E6/UL (ref 4.14–5.8)
SODIUM SERPL-SCNC: 140 MMOL/L (ref 134–144)
T4 FREE SERPL-MCNC: 1.01 NG/DL (ref 0.82–1.77)
TRIGL SERPL-MCNC: 122 MG/DL (ref 0–149)
TSH SERPL DL<=0.005 MIU/L-ACNC: 0.7 UIU/ML (ref 0.45–4.5)
VLDLC SERPL CALC-MCNC: 22 MG/DL (ref 5–40)
WBC # BLD AUTO: 12.3 X10E3/UL (ref 3.4–10.8)

## 2025-07-25 NOTE — TELEPHONE ENCOUNTER
----- Message from Dr. Gucci Saenz MD sent at 7/24/2025  5:30 PM EDT -----  Blood sugar is low at 59.  Patient should avoid skipping meals and eat regularly.  Kidney liver function cholesterol  Thyroid function hemoglobin A1c and vitamin D levels are all normal

## 2025-07-25 NOTE — TELEPHONE ENCOUNTER
The writer spoke with Ms. Mcclendon  phi verified, aware of lab results. Ms Mcclendon states that patient eats all meals and reports no changes to eating habits.

## 2025-08-19 DIAGNOSIS — E03.9 ACQUIRED HYPOTHYROIDISM: ICD-10-CM

## 2025-08-19 RX ORDER — LEVOTHYROXINE SODIUM 112 UG/1
112 TABLET ORAL DAILY
Qty: 90 TABLET | Refills: 1 | Status: SHIPPED | OUTPATIENT
Start: 2025-08-19